# Patient Record
Sex: FEMALE | Race: ASIAN | NOT HISPANIC OR LATINO | Employment: PART TIME
[De-identification: names, ages, dates, MRNs, and addresses within clinical notes are randomized per-mention and may not be internally consistent; named-entity substitution may affect disease eponyms.]

---

## 2017-04-26 ENCOUNTER — RECORDS - HEALTHEAST (OUTPATIENT)
Dept: ADMINISTRATIVE | Facility: OTHER | Age: 47
End: 2017-04-26

## 2017-10-08 ENCOUNTER — RECORDS - HEALTHEAST (OUTPATIENT)
Dept: ADMINISTRATIVE | Facility: OTHER | Age: 47
End: 2017-10-08

## 2017-10-18 ENCOUNTER — COMMUNICATION - HEALTHEAST (OUTPATIENT)
Dept: FAMILY MEDICINE | Facility: CLINIC | Age: 47
End: 2017-10-18

## 2017-10-24 ENCOUNTER — OFFICE VISIT - HEALTHEAST (OUTPATIENT)
Dept: FAMILY MEDICINE | Facility: CLINIC | Age: 47
End: 2017-10-24

## 2017-10-24 DIAGNOSIS — Z71.84 TRAVEL ADVICE ENCOUNTER: ICD-10-CM

## 2017-10-24 ASSESSMENT — MIFFLIN-ST. JEOR: SCORE: 1374.42

## 2017-11-14 ENCOUNTER — OFFICE VISIT - HEALTHEAST (OUTPATIENT)
Dept: FAMILY MEDICINE | Facility: CLINIC | Age: 47
End: 2017-11-14

## 2017-11-14 DIAGNOSIS — R06.2 WHEEZING: ICD-10-CM

## 2017-11-14 DIAGNOSIS — J98.01 ACUTE BRONCHOSPASM: ICD-10-CM

## 2017-11-14 ASSESSMENT — MIFFLIN-ST. JEOR: SCORE: 1381.11

## 2018-02-20 ENCOUNTER — OFFICE VISIT - HEALTHEAST (OUTPATIENT)
Dept: FAMILY MEDICINE | Facility: CLINIC | Age: 48
End: 2018-02-20

## 2018-02-20 DIAGNOSIS — M79.671 FOOT PAIN, RIGHT: ICD-10-CM

## 2018-02-20 DIAGNOSIS — D50.8 IRON DEFICIENCY ANEMIA SECONDARY TO INADEQUATE DIETARY IRON INTAKE: ICD-10-CM

## 2018-02-20 DIAGNOSIS — Z00.00 ANNUAL PHYSICAL EXAM: ICD-10-CM

## 2018-02-20 DIAGNOSIS — Z12.31 VISIT FOR SCREENING MAMMOGRAM: ICD-10-CM

## 2018-02-20 DIAGNOSIS — Z23 NEED FOR VACCINATION: ICD-10-CM

## 2018-02-20 DIAGNOSIS — E55.9 VITAMIN D DEFICIENCY: ICD-10-CM

## 2018-02-20 LAB
ALBUMIN SERPL-MCNC: 3.5 G/DL (ref 3.5–5)
ALP SERPL-CCNC: 119 U/L (ref 45–120)
ALT SERPL W P-5'-P-CCNC: 18 U/L (ref 0–45)
ANION GAP SERPL CALCULATED.3IONS-SCNC: 10 MMOL/L (ref 5–18)
AST SERPL W P-5'-P-CCNC: 15 U/L (ref 0–40)
BILIRUB SERPL-MCNC: 0.3 MG/DL (ref 0–1)
BUN SERPL-MCNC: 21 MG/DL (ref 8–22)
CALCIUM SERPL-MCNC: 8.9 MG/DL (ref 8.5–10.5)
CHLORIDE BLD-SCNC: 105 MMOL/L (ref 98–107)
CHOLEST SERPL-MCNC: 164 MG/DL
CO2 SERPL-SCNC: 22 MMOL/L (ref 22–31)
CREAT SERPL-MCNC: 0.66 MG/DL (ref 0.6–1.1)
ERYTHROCYTE [DISTWIDTH] IN BLOOD BY AUTOMATED COUNT: 12.4 % (ref 11–14.5)
FASTING STATUS PATIENT QL REPORTED: NO
FERRITIN SERPL-MCNC: 3 NG/ML (ref 10–130)
GFR SERPL CREATININE-BSD FRML MDRD: >60 ML/MIN/1.73M2
GLUCOSE BLD-MCNC: 96 MG/DL (ref 70–125)
HBA1C MFR BLD: 5.5 % (ref 3.5–6)
HCT VFR BLD AUTO: 36.3 % (ref 35–47)
HDLC SERPL-MCNC: 47 MG/DL
HGB BLD-MCNC: 12.1 G/DL (ref 12–16)
IRON SERPL-MCNC: 20 UG/DL (ref 42–175)
LDLC SERPL CALC-MCNC: 87 MG/DL
MCH RBC QN AUTO: 28.1 PG (ref 27–34)
MCHC RBC AUTO-ENTMCNC: 33.4 G/DL (ref 32–36)
MCV RBC AUTO: 84 FL (ref 80–100)
PLATELET # BLD AUTO: 230 THOU/UL (ref 140–440)
PMV BLD AUTO: 8.1 FL (ref 7–10)
POTASSIUM BLD-SCNC: 3.6 MMOL/L (ref 3.5–5)
PROT SERPL-MCNC: 7.5 G/DL (ref 6–8)
RBC # BLD AUTO: 4.31 MILL/UL (ref 3.8–5.4)
SODIUM SERPL-SCNC: 137 MMOL/L (ref 136–145)
TRIGL SERPL-MCNC: 152 MG/DL
TSH SERPL DL<=0.005 MIU/L-ACNC: 1.27 UIU/ML (ref 0.3–5)
WBC: 7 THOU/UL (ref 4–11)

## 2018-02-20 ASSESSMENT — MIFFLIN-ST. JEOR: SCORE: 1382.92

## 2018-02-21 LAB — 25(OH)D3 SERPL-MCNC: 29.1 NG/ML (ref 30–80)

## 2018-02-28 ENCOUNTER — COMMUNICATION - HEALTHEAST (OUTPATIENT)
Dept: FAMILY MEDICINE | Facility: CLINIC | Age: 48
End: 2018-02-28

## 2018-03-01 ENCOUNTER — COMMUNICATION - HEALTHEAST (OUTPATIENT)
Dept: FAMILY MEDICINE | Facility: CLINIC | Age: 48
End: 2018-03-01

## 2018-03-02 ENCOUNTER — HOSPITAL ENCOUNTER (OUTPATIENT)
Dept: MAMMOGRAPHY | Facility: CLINIC | Age: 48
Discharge: HOME | End: 2018-03-02
Attending: NURSE PRACTITIONER

## 2018-03-02 DIAGNOSIS — Z12.31 VISIT FOR SCREENING MAMMOGRAM: ICD-10-CM

## 2018-10-18 ENCOUNTER — RECORDS - HEALTHEAST (OUTPATIENT)
Dept: GENERAL RADIOLOGY | Facility: CLINIC | Age: 48
End: 2018-10-18

## 2018-10-18 ENCOUNTER — OFFICE VISIT - HEALTHEAST (OUTPATIENT)
Dept: FAMILY MEDICINE | Facility: CLINIC | Age: 48
End: 2018-10-18

## 2018-10-18 DIAGNOSIS — S99.921A FOOT INJURY, RIGHT, INITIAL ENCOUNTER: ICD-10-CM

## 2018-10-18 DIAGNOSIS — S99.921A UNSPECIFIED INJURY OF RIGHT FOOT, INITIAL ENCOUNTER: ICD-10-CM

## 2018-10-18 ASSESSMENT — MIFFLIN-ST. JEOR: SCORE: 1357.41

## 2018-10-22 ENCOUNTER — OFFICE VISIT - HEALTHEAST (OUTPATIENT)
Dept: FAMILY MEDICINE | Facility: CLINIC | Age: 48
End: 2018-10-22

## 2018-10-22 ENCOUNTER — RECORDS - HEALTHEAST (OUTPATIENT)
Dept: GENERAL RADIOLOGY | Facility: CLINIC | Age: 48
End: 2018-10-22

## 2018-10-22 DIAGNOSIS — M54.2 NECK PAIN: ICD-10-CM

## 2018-10-22 DIAGNOSIS — M54.2 CERVICALGIA: ICD-10-CM

## 2018-10-22 DIAGNOSIS — R10.2 PELVIC PAIN IN FEMALE: ICD-10-CM

## 2018-10-22 DIAGNOSIS — Z12.4 CERVICAL CANCER SCREENING: ICD-10-CM

## 2018-10-22 DIAGNOSIS — R05.9 COUGH: ICD-10-CM

## 2018-10-23 ENCOUNTER — HOSPITAL ENCOUNTER (OUTPATIENT)
Dept: ULTRASOUND IMAGING | Facility: CLINIC | Age: 48
Discharge: HOME | End: 2018-10-23
Attending: FAMILY MEDICINE

## 2018-10-23 DIAGNOSIS — R10.2 PELVIC PAIN IN FEMALE: ICD-10-CM

## 2018-10-23 LAB
HPV SOURCE: NORMAL
HUMAN PAPILLOMA VIRUS 16 DNA: NEGATIVE
HUMAN PAPILLOMA VIRUS 18 DNA: NEGATIVE
HUMAN PAPILLOMA VIRUS FINAL DIAGNOSIS: NORMAL
HUMAN PAPILLOMA VIRUS OTHER HR: NEGATIVE
SPECIMEN DESCRIPTION: NORMAL

## 2018-10-26 LAB
BKR LAB AP ABNORMAL BLEEDING: NO
BKR LAB AP BIRTH CONTROL/HORMONES: NORMAL
BKR LAB AP CERVICAL APPEARANCE: NORMAL
BKR LAB AP GYN ADEQUACY: NORMAL
BKR LAB AP GYN INTERPRETATION: NORMAL
BKR LAB AP HPV REFLEX: NORMAL
BKR LAB AP LMP: NORMAL
BKR LAB AP PATIENT STATUS: NORMAL
BKR LAB AP PREVIOUS ABNORMAL: NORMAL
BKR LAB AP PREVIOUS NORMAL: 2014
HIGH RISK?: NO
PATH REPORT.COMMENTS IMP SPEC: NORMAL
RESULT FLAG (HE HISTORICAL CONVERSION): NORMAL

## 2018-10-29 ENCOUNTER — COMMUNICATION - HEALTHEAST (OUTPATIENT)
Dept: FAMILY MEDICINE | Facility: CLINIC | Age: 48
End: 2018-10-29

## 2018-11-01 ENCOUNTER — COMMUNICATION - HEALTHEAST (OUTPATIENT)
Dept: ADMINISTRATIVE | Facility: CLINIC | Age: 48
End: 2018-11-01

## 2018-11-20 ENCOUNTER — OFFICE VISIT - HEALTHEAST (OUTPATIENT)
Dept: PHYSICAL THERAPY | Facility: REHABILITATION | Age: 48
End: 2018-11-20

## 2018-11-20 DIAGNOSIS — R29.3 POOR POSTURE: ICD-10-CM

## 2018-11-20 DIAGNOSIS — M54.2 NECK AND SHOULDER PAIN: ICD-10-CM

## 2018-11-20 DIAGNOSIS — M25.519 NECK AND SHOULDER PAIN: ICD-10-CM

## 2018-11-20 DIAGNOSIS — M62.81 GENERALIZED MUSCLE WEAKNESS: ICD-10-CM

## 2018-11-26 ENCOUNTER — OFFICE VISIT - HEALTHEAST (OUTPATIENT)
Dept: PHYSICAL THERAPY | Facility: REHABILITATION | Age: 48
End: 2018-11-26

## 2018-11-26 DIAGNOSIS — M62.81 GENERALIZED MUSCLE WEAKNESS: ICD-10-CM

## 2018-11-26 DIAGNOSIS — M54.2 NECK AND SHOULDER PAIN: ICD-10-CM

## 2018-11-26 DIAGNOSIS — M25.519 NECK AND SHOULDER PAIN: ICD-10-CM

## 2018-11-26 DIAGNOSIS — R29.3 POOR POSTURE: ICD-10-CM

## 2018-12-03 ENCOUNTER — OFFICE VISIT - HEALTHEAST (OUTPATIENT)
Dept: PHYSICAL THERAPY | Facility: REHABILITATION | Age: 48
End: 2018-12-03

## 2018-12-03 DIAGNOSIS — M54.2 NECK AND SHOULDER PAIN: ICD-10-CM

## 2018-12-03 DIAGNOSIS — M62.81 GENERALIZED MUSCLE WEAKNESS: ICD-10-CM

## 2018-12-03 DIAGNOSIS — M25.519 NECK AND SHOULDER PAIN: ICD-10-CM

## 2018-12-03 DIAGNOSIS — R29.3 POOR POSTURE: ICD-10-CM

## 2018-12-05 ENCOUNTER — OFFICE VISIT - HEALTHEAST (OUTPATIENT)
Dept: PHYSICAL THERAPY | Facility: REHABILITATION | Age: 48
End: 2018-12-05

## 2018-12-05 DIAGNOSIS — M54.2 NECK AND SHOULDER PAIN: ICD-10-CM

## 2018-12-05 DIAGNOSIS — M25.519 NECK AND SHOULDER PAIN: ICD-10-CM

## 2018-12-05 DIAGNOSIS — R29.3 POOR POSTURE: ICD-10-CM

## 2018-12-05 DIAGNOSIS — M62.81 GENERALIZED MUSCLE WEAKNESS: ICD-10-CM

## 2018-12-10 ENCOUNTER — OFFICE VISIT - HEALTHEAST (OUTPATIENT)
Dept: PHYSICAL THERAPY | Facility: REHABILITATION | Age: 48
End: 2018-12-10

## 2018-12-10 DIAGNOSIS — M25.519 NECK AND SHOULDER PAIN: ICD-10-CM

## 2018-12-10 DIAGNOSIS — R29.3 POOR POSTURE: ICD-10-CM

## 2018-12-10 DIAGNOSIS — M54.2 NECK AND SHOULDER PAIN: ICD-10-CM

## 2018-12-10 DIAGNOSIS — M62.81 GENERALIZED MUSCLE WEAKNESS: ICD-10-CM

## 2018-12-11 ENCOUNTER — OFFICE VISIT - HEALTHEAST (OUTPATIENT)
Dept: FAMILY MEDICINE | Facility: CLINIC | Age: 48
End: 2018-12-11

## 2018-12-11 DIAGNOSIS — M54.2 NECK PAIN: ICD-10-CM

## 2018-12-11 DIAGNOSIS — R51.9 NONINTRACTABLE EPISODIC HEADACHE, UNSPECIFIED HEADACHE TYPE: ICD-10-CM

## 2018-12-12 ENCOUNTER — OFFICE VISIT - HEALTHEAST (OUTPATIENT)
Dept: PHYSICAL THERAPY | Facility: REHABILITATION | Age: 48
End: 2018-12-12

## 2018-12-12 DIAGNOSIS — M54.2 NECK AND SHOULDER PAIN: ICD-10-CM

## 2018-12-12 DIAGNOSIS — R29.3 POOR POSTURE: ICD-10-CM

## 2018-12-12 DIAGNOSIS — M62.81 GENERALIZED MUSCLE WEAKNESS: ICD-10-CM

## 2018-12-12 DIAGNOSIS — M25.519 NECK AND SHOULDER PAIN: ICD-10-CM

## 2018-12-19 ENCOUNTER — OFFICE VISIT - HEALTHEAST (OUTPATIENT)
Dept: PHYSICAL THERAPY | Facility: REHABILITATION | Age: 48
End: 2018-12-19

## 2018-12-19 DIAGNOSIS — M25.519 NECK AND SHOULDER PAIN: ICD-10-CM

## 2018-12-19 DIAGNOSIS — M62.81 GENERALIZED MUSCLE WEAKNESS: ICD-10-CM

## 2018-12-19 DIAGNOSIS — R29.3 POOR POSTURE: ICD-10-CM

## 2018-12-19 DIAGNOSIS — M54.2 NECK AND SHOULDER PAIN: ICD-10-CM

## 2019-02-11 ENCOUNTER — OFFICE VISIT - HEALTHEAST (OUTPATIENT)
Dept: FAMILY MEDICINE | Facility: CLINIC | Age: 49
End: 2019-02-11

## 2019-02-11 ENCOUNTER — RECORDS - HEALTHEAST (OUTPATIENT)
Dept: GENERAL RADIOLOGY | Facility: CLINIC | Age: 49
End: 2019-02-11

## 2019-02-11 DIAGNOSIS — R51.9 NONINTRACTABLE HEADACHE, UNSPECIFIED CHRONICITY PATTERN, UNSPECIFIED HEADACHE TYPE: ICD-10-CM

## 2019-02-11 DIAGNOSIS — M79.674 PAIN IN RIGHT TOE(S): ICD-10-CM

## 2019-02-11 DIAGNOSIS — M54.2 NECK PAIN: ICD-10-CM

## 2019-02-11 DIAGNOSIS — M79.674 PAIN OF TOE OF RIGHT FOOT: ICD-10-CM

## 2019-02-11 DIAGNOSIS — J30.9 ALLERGIC RHINITIS: ICD-10-CM

## 2019-02-26 ENCOUNTER — RECORDS - HEALTHEAST (OUTPATIENT)
Dept: ADMINISTRATIVE | Facility: OTHER | Age: 49
End: 2019-02-26

## 2019-04-03 ENCOUNTER — OFFICE VISIT - HEALTHEAST (OUTPATIENT)
Dept: FAMILY MEDICINE | Facility: CLINIC | Age: 49
End: 2019-04-03

## 2019-04-03 DIAGNOSIS — Z13.220 LIPID SCREENING: ICD-10-CM

## 2019-04-03 DIAGNOSIS — D50.8 IRON DEFICIENCY ANEMIA SECONDARY TO INADEQUATE DIETARY IRON INTAKE: ICD-10-CM

## 2019-04-03 DIAGNOSIS — E04.9 THYROID ENLARGED: ICD-10-CM

## 2019-04-03 DIAGNOSIS — Z12.31 VISIT FOR SCREENING MAMMOGRAM: ICD-10-CM

## 2019-04-03 DIAGNOSIS — Z00.00 ROUTINE GENERAL MEDICAL EXAMINATION AT A HEALTH CARE FACILITY: ICD-10-CM

## 2019-04-03 DIAGNOSIS — R53.83 OTHER FATIGUE: ICD-10-CM

## 2019-04-03 DIAGNOSIS — Z13.1 SCREENING FOR DIABETES MELLITUS: ICD-10-CM

## 2019-04-03 DIAGNOSIS — J30.9 ALLERGIC RHINITIS, UNSPECIFIED SEASONALITY, UNSPECIFIED TRIGGER: ICD-10-CM

## 2019-04-03 DIAGNOSIS — E55.9 VITAMIN D DEFICIENCY: ICD-10-CM

## 2019-04-03 DIAGNOSIS — Z83.49 FAMILY HISTORY OF B12 DEFICIENCY: ICD-10-CM

## 2019-04-03 ASSESSMENT — MIFFLIN-ST. JEOR: SCORE: 1380.65

## 2019-04-08 ENCOUNTER — HOSPITAL ENCOUNTER (OUTPATIENT)
Dept: ULTRASOUND IMAGING | Facility: CLINIC | Age: 49
Discharge: HOME | End: 2019-04-08
Attending: FAMILY MEDICINE

## 2019-04-08 DIAGNOSIS — E04.9 THYROID ENLARGED: ICD-10-CM

## 2019-04-09 ENCOUNTER — AMBULATORY - HEALTHEAST (OUTPATIENT)
Dept: LAB | Facility: CLINIC | Age: 49
End: 2019-04-09

## 2019-04-09 DIAGNOSIS — Z13.220 LIPID SCREENING: ICD-10-CM

## 2019-04-09 DIAGNOSIS — Z83.49 FAMILY HISTORY OF B12 DEFICIENCY: ICD-10-CM

## 2019-04-09 DIAGNOSIS — E55.9 VITAMIN D DEFICIENCY: ICD-10-CM

## 2019-04-09 DIAGNOSIS — Z13.1 SCREENING FOR DIABETES MELLITUS: ICD-10-CM

## 2019-04-09 DIAGNOSIS — E04.9 THYROID ENLARGED: ICD-10-CM

## 2019-04-09 DIAGNOSIS — R53.83 OTHER FATIGUE: ICD-10-CM

## 2019-04-09 DIAGNOSIS — D50.8 IRON DEFICIENCY ANEMIA SECONDARY TO INADEQUATE DIETARY IRON INTAKE: ICD-10-CM

## 2019-04-09 LAB
ALBUMIN SERPL-MCNC: 3.7 G/DL (ref 3.5–5)
ALP SERPL-CCNC: 106 U/L (ref 45–120)
ALT SERPL W P-5'-P-CCNC: 17 U/L (ref 0–45)
ANION GAP SERPL CALCULATED.3IONS-SCNC: 10 MMOL/L (ref 5–18)
AST SERPL W P-5'-P-CCNC: 16 U/L (ref 0–40)
BILIRUB SERPL-MCNC: 0.3 MG/DL (ref 0–1)
BUN SERPL-MCNC: 16 MG/DL (ref 8–22)
CALCIUM SERPL-MCNC: 9.1 MG/DL (ref 8.5–10.5)
CHLORIDE BLD-SCNC: 104 MMOL/L (ref 98–107)
CHOLEST SERPL-MCNC: 159 MG/DL
CO2 SERPL-SCNC: 26 MMOL/L (ref 22–31)
CREAT SERPL-MCNC: 0.76 MG/DL (ref 0.6–1.1)
ERYTHROCYTE [DISTWIDTH] IN BLOOD BY AUTOMATED COUNT: 11.3 % (ref 11–14.5)
FASTING STATUS PATIENT QL REPORTED: YES
FERRITIN SERPL-MCNC: 8 NG/ML (ref 10–130)
GFR SERPL CREATININE-BSD FRML MDRD: >60 ML/MIN/1.73M2
GLUCOSE BLD-MCNC: 102 MG/DL (ref 70–125)
HBA1C MFR BLD: 5.5 % (ref 3.5–6)
HCT VFR BLD AUTO: 39.5 % (ref 35–47)
HDLC SERPL-MCNC: 52 MG/DL
HGB BLD-MCNC: 13.1 G/DL (ref 12–16)
IRON SATN MFR SERPL: 15 % (ref 20–50)
IRON SERPL-MCNC: 51 UG/DL (ref 42–175)
LDLC SERPL CALC-MCNC: 90 MG/DL
MCH RBC QN AUTO: 31 PG (ref 27–34)
MCHC RBC AUTO-ENTMCNC: 33.3 G/DL (ref 32–36)
MCV RBC AUTO: 93 FL (ref 80–100)
PLATELET # BLD AUTO: 227 THOU/UL (ref 140–440)
PMV BLD AUTO: 8.7 FL (ref 7–10)
POTASSIUM BLD-SCNC: 4 MMOL/L (ref 3.5–5)
PROT SERPL-MCNC: 7.2 G/DL (ref 6–8)
RBC # BLD AUTO: 4.23 MILL/UL (ref 3.8–5.4)
SODIUM SERPL-SCNC: 140 MMOL/L (ref 136–145)
T4 FREE SERPL-MCNC: 1 NG/DL (ref 0.7–1.8)
TIBC SERPL-MCNC: 350 UG/DL (ref 313–563)
TRANSFERRIN SERPL-MCNC: 280 MG/DL (ref 212–360)
TRIGL SERPL-MCNC: 85 MG/DL
TSH SERPL DL<=0.005 MIU/L-ACNC: 3 UIU/ML (ref 0.3–5)
VIT B12 SERPL-MCNC: 149 PG/ML (ref 213–816)
WBC: 4.2 THOU/UL (ref 4–11)

## 2019-04-10 LAB — 25(OH)D3 SERPL-MCNC: 36.5 NG/ML (ref 30–80)

## 2019-04-11 ENCOUNTER — COMMUNICATION - HEALTHEAST (OUTPATIENT)
Dept: FAMILY MEDICINE | Facility: CLINIC | Age: 49
End: 2019-04-11

## 2019-07-10 ENCOUNTER — HOSPITAL ENCOUNTER (OUTPATIENT)
Dept: MAMMOGRAPHY | Facility: CLINIC | Age: 49
Discharge: HOME | End: 2019-07-10
Attending: FAMILY MEDICINE

## 2019-07-10 DIAGNOSIS — Z12.31 VISIT FOR SCREENING MAMMOGRAM: ICD-10-CM

## 2020-01-29 ENCOUNTER — OFFICE VISIT - HEALTHEAST (OUTPATIENT)
Dept: FAMILY MEDICINE | Facility: CLINIC | Age: 50
End: 2020-01-29

## 2020-01-29 DIAGNOSIS — D50.9 IRON DEFICIENCY ANEMIA, UNSPECIFIED IRON DEFICIENCY ANEMIA TYPE: ICD-10-CM

## 2020-01-29 DIAGNOSIS — L29.9 ITCHY SKIN: ICD-10-CM

## 2020-01-29 DIAGNOSIS — T78.40XS ALLERGIC STATE, SEQUELA: ICD-10-CM

## 2020-01-29 LAB
ALBUMIN SERPL-MCNC: 3.9 G/DL (ref 3.5–5)
ALP SERPL-CCNC: 119 U/L (ref 45–120)
ALT SERPL W P-5'-P-CCNC: 18 U/L (ref 0–45)
ANION GAP SERPL CALCULATED.3IONS-SCNC: 9 MMOL/L (ref 5–18)
AST SERPL W P-5'-P-CCNC: 15 U/L (ref 0–40)
BILIRUB SERPL-MCNC: 0.3 MG/DL (ref 0–1)
BUN SERPL-MCNC: 16 MG/DL (ref 8–22)
CALCIUM SERPL-MCNC: 9.1 MG/DL (ref 8.5–10.5)
CHLORIDE BLD-SCNC: 103 MMOL/L (ref 98–107)
CLUE CELLS: NORMAL
CO2 SERPL-SCNC: 26 MMOL/L (ref 22–31)
CREAT SERPL-MCNC: 0.72 MG/DL (ref 0.6–1.1)
ERYTHROCYTE [DISTWIDTH] IN BLOOD BY AUTOMATED COUNT: 12.8 % (ref 11–14.5)
FERRITIN SERPL-MCNC: <2 NG/ML (ref 10–130)
GFR SERPL CREATININE-BSD FRML MDRD: >60 ML/MIN/1.73M2
GLUCOSE BLD-MCNC: 96 MG/DL (ref 70–125)
HCT VFR BLD AUTO: 31.3 % (ref 35–47)
HGB BLD-MCNC: 10 G/DL (ref 12–16)
MCH RBC QN AUTO: 23.5 PG (ref 27–34)
MCHC RBC AUTO-ENTMCNC: 31.9 G/DL (ref 32–36)
MCV RBC AUTO: 74 FL (ref 80–100)
PLATELET # BLD AUTO: 277 THOU/UL (ref 140–440)
PMV BLD AUTO: 7.8 FL (ref 7–10)
POTASSIUM BLD-SCNC: 3.8 MMOL/L (ref 3.5–5)
PROT SERPL-MCNC: 7.6 G/DL (ref 6–8)
RBC # BLD AUTO: 4.25 MILL/UL (ref 3.8–5.4)
SODIUM SERPL-SCNC: 138 MMOL/L (ref 136–145)
TRICHOMONAS, WET PREP: NORMAL
WBC: 6.9 THOU/UL (ref 4–11)
YEAST, WET PREP: NORMAL

## 2020-01-30 ENCOUNTER — COMMUNICATION - HEALTHEAST (OUTPATIENT)
Dept: FAMILY MEDICINE | Facility: CLINIC | Age: 50
End: 2020-01-30

## 2020-04-20 ENCOUNTER — OFFICE VISIT - HEALTHEAST (OUTPATIENT)
Dept: FAMILY MEDICINE | Facility: CLINIC | Age: 50
End: 2020-04-20

## 2020-04-20 DIAGNOSIS — E55.9 VITAMIN D DEFICIENCY: ICD-10-CM

## 2020-04-20 DIAGNOSIS — E53.8 VITAMIN B12 DEFICIENCY (NON ANEMIC): ICD-10-CM

## 2020-04-20 DIAGNOSIS — D50.8 IRON DEFICIENCY ANEMIA SECONDARY TO INADEQUATE DIETARY IRON INTAKE: ICD-10-CM

## 2020-04-20 DIAGNOSIS — L29.9 ITCHY SKIN: ICD-10-CM

## 2020-04-21 ENCOUNTER — AMBULATORY - HEALTHEAST (OUTPATIENT)
Dept: LAB | Facility: CLINIC | Age: 50
End: 2020-04-21

## 2020-04-21 DIAGNOSIS — L29.9 ITCHY SKIN: ICD-10-CM

## 2020-04-21 DIAGNOSIS — E55.9 VITAMIN D DEFICIENCY: ICD-10-CM

## 2020-04-21 DIAGNOSIS — E53.8 VITAMIN B12 DEFICIENCY (NON ANEMIC): ICD-10-CM

## 2020-04-21 DIAGNOSIS — D50.8 IRON DEFICIENCY ANEMIA SECONDARY TO INADEQUATE DIETARY IRON INTAKE: ICD-10-CM

## 2020-04-21 LAB
BASOPHILS # BLD AUTO: 0 THOU/UL (ref 0–0.2)
BASOPHILS NFR BLD AUTO: 0 % (ref 0–2)
EOSINOPHIL # BLD AUTO: 0.7 THOU/UL (ref 0–0.4)
EOSINOPHIL NFR BLD AUTO: 12 % (ref 0–6)
ERYTHROCYTE [DISTWIDTH] IN BLOOD BY AUTOMATED COUNT: 17.3 % (ref 11–14.5)
ERYTHROCYTE [SEDIMENTATION RATE] IN BLOOD BY WESTERGREN METHOD: 8 MM/HR (ref 0–20)
FERRITIN SERPL-MCNC: 11 NG/ML (ref 10–130)
HCT VFR BLD AUTO: 43.7 % (ref 35–47)
HGB BLD-MCNC: 14.1 G/DL (ref 12–16)
IRON SATN MFR SERPL: 38 % (ref 20–50)
IRON SERPL-MCNC: 125 UG/DL (ref 42–175)
LYMPHOCYTES # BLD AUTO: 1.4 THOU/UL (ref 0.8–4.4)
LYMPHOCYTES NFR BLD AUTO: 22 % (ref 20–40)
MCH RBC QN AUTO: 27.8 PG (ref 27–34)
MCHC RBC AUTO-ENTMCNC: 32.3 G/DL (ref 32–36)
MCV RBC AUTO: 86 FL (ref 80–100)
MONOCYTES # BLD AUTO: 0.4 THOU/UL (ref 0–0.9)
MONOCYTES NFR BLD AUTO: 6 % (ref 2–10)
NEUTROPHILS # BLD AUTO: 3.8 THOU/UL (ref 2–7.7)
NEUTROPHILS NFR BLD AUTO: 60 % (ref 50–70)
PLATELET # BLD AUTO: 239 THOU/UL (ref 140–440)
PMV BLD AUTO: 11.2 FL (ref 8.5–12.5)
RBC # BLD AUTO: 5.08 MILL/UL (ref 3.8–5.4)
TIBC SERPL-MCNC: 329 UG/DL (ref 313–563)
TRANSFERRIN SERPL-MCNC: 263 MG/DL (ref 212–360)
VIT B12 SERPL-MCNC: >2000 PG/ML (ref 213–816)
WBC: 6.3 THOU/UL (ref 4–11)

## 2020-04-23 LAB
25(OH)D3 SERPL-MCNC: 38 NG/ML (ref 30–80)
25(OH)D3 SERPL-MCNC: 38 NG/ML (ref 30–80)

## 2020-04-24 LAB
GLIADIN IGA SER-ACNC: 1.8 U/ML
GLIADIN IGG SER-ACNC: 0.5 U/ML
IGA SERPL-MCNC: 185 MG/DL (ref 65–400)
TTG IGA SER-ACNC: 0.4 U/ML
TTG IGG SER-ACNC: <0.6 U/ML

## 2020-04-29 ENCOUNTER — COMMUNICATION - HEALTHEAST (OUTPATIENT)
Dept: FAMILY MEDICINE | Facility: CLINIC | Age: 50
End: 2020-04-29

## 2020-05-06 ENCOUNTER — RECORDS - HEALTHEAST (OUTPATIENT)
Dept: ADMINISTRATIVE | Facility: OTHER | Age: 50
End: 2020-05-06

## 2020-06-12 ENCOUNTER — RECORDS - HEALTHEAST (OUTPATIENT)
Dept: ADMINISTRATIVE | Facility: OTHER | Age: 50
End: 2020-06-12

## 2020-07-02 ENCOUNTER — OFFICE VISIT - HEALTHEAST (OUTPATIENT)
Dept: FAMILY MEDICINE | Facility: CLINIC | Age: 50
End: 2020-07-02

## 2020-07-02 DIAGNOSIS — R35.0 FREQUENT URINATION: ICD-10-CM

## 2020-07-02 DIAGNOSIS — N30.01 ACUTE CYSTITIS WITH HEMATURIA: ICD-10-CM

## 2020-07-02 LAB
ALBUMIN UR-MCNC: ABNORMAL MG/DL
APPEARANCE UR: CLEAR
BILIRUB UR QL STRIP: NEGATIVE
COLOR UR AUTO: YELLOW
GLUCOSE UR STRIP-MCNC: NEGATIVE MG/DL
HGB UR QL STRIP: ABNORMAL
KETONES UR STRIP-MCNC: NEGATIVE MG/DL
LEUKOCYTE ESTERASE UR QL STRIP: ABNORMAL
NITRATE UR QL: NEGATIVE
PH UR STRIP: 6.5 [PH] (ref 5–8)
SP GR UR STRIP: 1.01 (ref 1–1.03)
UROBILINOGEN UR STRIP-ACNC: ABNORMAL

## 2020-07-02 ASSESSMENT — MIFFLIN-ST. JEOR: SCORE: 1372.15

## 2020-07-04 LAB — BACTERIA SPEC CULT: ABNORMAL

## 2020-08-11 ENCOUNTER — HOSPITAL ENCOUNTER (OUTPATIENT)
Dept: MAMMOGRAPHY | Facility: CLINIC | Age: 50
Discharge: HOME | End: 2020-08-11
Attending: FAMILY MEDICINE

## 2020-08-11 DIAGNOSIS — Z12.31 VISIT FOR SCREENING MAMMOGRAM: ICD-10-CM

## 2020-08-20 ENCOUNTER — HOSPITAL ENCOUNTER (OUTPATIENT)
Dept: MAMMOGRAPHY | Facility: CLINIC | Age: 50
Discharge: HOME | End: 2020-08-20
Attending: FAMILY MEDICINE

## 2020-08-20 DIAGNOSIS — N64.89 BREAST ASYMMETRY: ICD-10-CM

## 2020-08-26 ENCOUNTER — OFFICE VISIT - HEALTHEAST (OUTPATIENT)
Dept: FAMILY MEDICINE | Facility: CLINIC | Age: 50
End: 2020-08-26

## 2020-08-26 DIAGNOSIS — Z13.1 SCREENING FOR DIABETES MELLITUS: ICD-10-CM

## 2020-08-26 DIAGNOSIS — E53.8 VITAMIN B12 DEFICIENCY (NON ANEMIC): ICD-10-CM

## 2020-08-26 DIAGNOSIS — Z00.00 ROUTINE GENERAL MEDICAL EXAMINATION AT A HEALTH CARE FACILITY: ICD-10-CM

## 2020-08-26 DIAGNOSIS — L29.9 ITCHY SKIN: ICD-10-CM

## 2020-08-26 DIAGNOSIS — D50.8 IRON DEFICIENCY ANEMIA SECONDARY TO INADEQUATE DIETARY IRON INTAKE: ICD-10-CM

## 2020-08-26 DIAGNOSIS — Z13.220 LIPID SCREENING: ICD-10-CM

## 2020-08-26 DIAGNOSIS — E55.9 VITAMIN D DEFICIENCY: ICD-10-CM

## 2020-08-26 ASSESSMENT — MIFFLIN-ST. JEOR: SCORE: 1367.5

## 2020-09-14 ENCOUNTER — RECORDS - HEALTHEAST (OUTPATIENT)
Dept: ADMINISTRATIVE | Facility: OTHER | Age: 50
End: 2020-09-14

## 2020-10-19 ENCOUNTER — RECORDS - HEALTHEAST (OUTPATIENT)
Dept: ADMINISTRATIVE | Facility: OTHER | Age: 50
End: 2020-10-19

## 2020-10-22 ENCOUNTER — RECORDS - HEALTHEAST (OUTPATIENT)
Dept: ADMINISTRATIVE | Facility: OTHER | Age: 50
End: 2020-10-22

## 2020-11-09 ENCOUNTER — RECORDS - HEALTHEAST (OUTPATIENT)
Dept: GENERAL RADIOLOGY | Facility: CLINIC | Age: 50
End: 2020-11-09

## 2020-11-09 ENCOUNTER — OFFICE VISIT - HEALTHEAST (OUTPATIENT)
Dept: INTERNAL MEDICINE | Facility: CLINIC | Age: 50
End: 2020-11-09

## 2020-11-09 DIAGNOSIS — M79.671 RIGHT FOOT PAIN: ICD-10-CM

## 2020-11-09 DIAGNOSIS — Z13.1 SCREENING FOR DIABETES MELLITUS: ICD-10-CM

## 2020-11-09 DIAGNOSIS — D50.8 IRON DEFICIENCY ANEMIA SECONDARY TO INADEQUATE DIETARY IRON INTAKE: ICD-10-CM

## 2020-11-09 DIAGNOSIS — M79.671 PAIN IN RIGHT FOOT: ICD-10-CM

## 2020-11-09 DIAGNOSIS — R30.0 DYSURIA: ICD-10-CM

## 2020-11-09 DIAGNOSIS — M79.671 PAIN OF RIGHT HEEL: ICD-10-CM

## 2020-11-09 DIAGNOSIS — R35.0 URINARY FREQUENCY: ICD-10-CM

## 2020-11-09 DIAGNOSIS — E53.8 VITAMIN B12 DEFICIENCY (NON ANEMIC): ICD-10-CM

## 2020-11-09 DIAGNOSIS — E55.9 VITAMIN D DEFICIENCY: ICD-10-CM

## 2020-11-09 DIAGNOSIS — Z13.220 LIPID SCREENING: ICD-10-CM

## 2020-11-09 LAB
ALBUMIN SERPL-MCNC: 4 G/DL (ref 3.5–5)
ALBUMIN UR-MCNC: NEGATIVE MG/DL
ALP SERPL-CCNC: 119 U/L (ref 45–120)
ALT SERPL W P-5'-P-CCNC: 17 U/L (ref 0–45)
ANION GAP SERPL CALCULATED.3IONS-SCNC: 11 MMOL/L (ref 5–18)
APPEARANCE UR: CLEAR
AST SERPL W P-5'-P-CCNC: 14 U/L (ref 0–40)
BACTERIA #/AREA URNS HPF: ABNORMAL HPF
BASOPHILS # BLD AUTO: 0 THOU/UL (ref 0–0.2)
BASOPHILS NFR BLD AUTO: 0 % (ref 0–2)
BILIRUB SERPL-MCNC: 0.2 MG/DL (ref 0–1)
BILIRUB UR QL STRIP: NEGATIVE
BUN SERPL-MCNC: 19 MG/DL (ref 8–22)
CALCIUM SERPL-MCNC: 9.3 MG/DL (ref 8.5–10.5)
CHLORIDE BLD-SCNC: 104 MMOL/L (ref 98–107)
CHOLEST SERPL-MCNC: 162 MG/DL
CO2 SERPL-SCNC: 24 MMOL/L (ref 22–31)
COLOR UR AUTO: YELLOW
CREAT SERPL-MCNC: 0.7 MG/DL (ref 0.6–1.1)
EOSINOPHIL # BLD AUTO: 0.6 THOU/UL (ref 0–0.4)
EOSINOPHIL NFR BLD AUTO: 9 % (ref 0–6)
ERYTHROCYTE [DISTWIDTH] IN BLOOD BY AUTOMATED COUNT: 11.6 % (ref 11–14.5)
FASTING STATUS PATIENT QL REPORTED: YES
GFR SERPL CREATININE-BSD FRML MDRD: >60 ML/MIN/1.73M2
GLUCOSE BLD-MCNC: 96 MG/DL (ref 70–125)
GLUCOSE UR STRIP-MCNC: NEGATIVE MG/DL
HCT VFR BLD AUTO: 34.1 % (ref 35–47)
HDLC SERPL-MCNC: 53 MG/DL
HGB BLD-MCNC: 11 G/DL (ref 12–16)
HGB UR QL STRIP: ABNORMAL
IRON SATN MFR SERPL: 3 % (ref 20–50)
IRON SERPL-MCNC: 13 UG/DL (ref 42–175)
KETONES UR STRIP-MCNC: NEGATIVE MG/DL
LDLC SERPL CALC-MCNC: 83 MG/DL
LEUKOCYTE ESTERASE UR QL STRIP: ABNORMAL
LYMPHOCYTES # BLD AUTO: 1.6 THOU/UL (ref 0.8–4.4)
LYMPHOCYTES NFR BLD AUTO: 25 % (ref 20–40)
MCH RBC QN AUTO: 25.8 PG (ref 27–34)
MCHC RBC AUTO-ENTMCNC: 32.2 G/DL (ref 32–36)
MCV RBC AUTO: 80 FL (ref 80–100)
MONOCYTES # BLD AUTO: 0.2 THOU/UL (ref 0–0.9)
MONOCYTES NFR BLD AUTO: 3 % (ref 2–10)
NEUTROPHILS # BLD AUTO: 4.2 THOU/UL (ref 2–7.7)
NEUTROPHILS NFR BLD AUTO: 63 % (ref 50–70)
NITRATE UR QL: NEGATIVE
PH UR STRIP: 7 [PH] (ref 5–8)
PLATELET # BLD AUTO: 273 THOU/UL (ref 140–440)
PMV BLD AUTO: 8.3 FL (ref 7–10)
POTASSIUM BLD-SCNC: 4.2 MMOL/L (ref 3.5–5)
PROT SERPL-MCNC: 7.7 G/DL (ref 6–8)
RBC # BLD AUTO: 4.24 MILL/UL (ref 3.8–5.4)
RBC #/AREA URNS AUTO: ABNORMAL HPF
SODIUM SERPL-SCNC: 139 MMOL/L (ref 136–145)
SP GR UR STRIP: 1.01 (ref 1–1.03)
SQUAMOUS #/AREA URNS AUTO: ABNORMAL LPF
TIBC SERPL-MCNC: 432 UG/DL (ref 313–563)
TRANSFERRIN SERPL-MCNC: 346 MG/DL (ref 212–360)
TRIGL SERPL-MCNC: 129 MG/DL
UROBILINOGEN UR STRIP-ACNC: ABNORMAL
VIT B12 SERPL-MCNC: 507 PG/ML (ref 213–816)
WBC #/AREA URNS AUTO: ABNORMAL HPF
WBC: 6.7 THOU/UL (ref 4–11)

## 2020-11-10 LAB
25(OH)D3 SERPL-MCNC: 29.8 NG/ML (ref 30–80)
25(OH)D3 SERPL-MCNC: 29.8 NG/ML (ref 30–80)
BACTERIA SPEC CULT: NO GROWTH

## 2020-12-16 ENCOUNTER — RECORDS - HEALTHEAST (OUTPATIENT)
Dept: ADMINISTRATIVE | Facility: OTHER | Age: 50
End: 2020-12-16

## 2021-02-08 ENCOUNTER — RECORDS - HEALTHEAST (OUTPATIENT)
Dept: GENERAL RADIOLOGY | Facility: CLINIC | Age: 51
End: 2021-02-08

## 2021-02-08 ENCOUNTER — OFFICE VISIT - HEALTHEAST (OUTPATIENT)
Dept: FAMILY MEDICINE | Facility: CLINIC | Age: 51
End: 2021-02-08

## 2021-02-08 DIAGNOSIS — M54.50 LOW BACK PAIN: ICD-10-CM

## 2021-02-08 DIAGNOSIS — M54.50 ACUTE MIDLINE LOW BACK PAIN WITHOUT SCIATICA: ICD-10-CM

## 2021-02-08 LAB — HCG UR QL: NEGATIVE

## 2021-02-08 RX ORDER — LORATADINE 10 MG/1
10 TABLET ORAL DAILY
Status: SHIPPED | COMMUNITY
Start: 2021-02-08

## 2021-03-04 ENCOUNTER — COMMUNICATION - HEALTHEAST (OUTPATIENT)
Dept: FAMILY MEDICINE | Facility: CLINIC | Age: 51
End: 2021-03-04

## 2021-03-15 ENCOUNTER — COMMUNICATION - HEALTHEAST (OUTPATIENT)
Dept: FAMILY MEDICINE | Facility: CLINIC | Age: 51
End: 2021-03-15

## 2021-05-26 ENCOUNTER — RECORDS - HEALTHEAST (OUTPATIENT)
Dept: ADMINISTRATIVE | Facility: CLINIC | Age: 51
End: 2021-05-26

## 2021-05-27 NOTE — PROGRESS NOTES
Assessment:     1. Routine general medical examination at a health care facility    2. Thyroid enlarged    3. Vitamin D deficiency    4. Iron deficiency anemia secondary to inadequate dietary iron intake    5. Other fatigue    6. Visit for screening mammogram    7. Lipid screening    8. Family history of B12 deficiency    9. Screening for diabetes mellitus        Plan:      1. Routine general medical examination at a health care facility  -Routine health maintenance discussion:  No smoking, limited alcohol (7 or less servings per week), 5 fruits/veg servings per day, 200 minutes of exercise per week.  Daily calcium/vitamin D guidelines, bone health, colon cancer screening beginning at age 50.  Accident avoidance, sun screen.   -Deferred the back pain, her exam is normal in general today and she does have a workers comp information that she will work through for further evaluation and assessment.  Recommended she call them when she is agreeable to doing.    2. Thyroid enlarged  -There is seems somewhat enlarged on exam today, referral for ultrasound placed and thyroid function testing done.  - US Thyroid; Future  - T4, Free; Future  - Thyroid Stimulating Hormone (TSH); Future    3. Vitamin D deficiency  -Has had low vitamin D in the past as well, updating when she returns for fasting labs.  - Vitamin D, Total (25-Hydroxy); Future    4. Iron deficiency anemia secondary to inadequate dietary iron intake  -Due to menorrhagia she has had low hemoglobin in the past, updating her iron stores as well as her hemoglobin to ensure these are remaining adequately replaced.  - HM2(CBC w/o Differential); Future  - Ferritin; Future  - Iron and Transferrin Iron Binding Capacity; Future    5. Other fatigue  -Has been fatigued in the past, updating her electrolyte panel  - Comprehensive Metabolic Panel; Future    6. Visit for screening mammogram  - Mammo Screening Bilateral; Future    7. Lipid screening  - Lipid Cascade; Future    8.  Family history of B12 deficiency  -Several family members have B12 deficiency, updating level with her future blood draw  - Vitamin B12; Future    9. Screening for diabetes mellitus  - Glycosylated Hemoglobin A1c; Future    10.  Allergic rhinitis  -Okay for Flonase referrals for the year.       Subjective:      Renetta Joyner is a 49 y.o. female who presents for an annual exam. The patient is sexually active. The patient participates in regular exercise: yes. The patient reports that there is not domestic violence in her life.     She has had some back pain. She did fall on some ice about 3 weeks ago. She does have some pain with sitting and standing. She has been using a heating pad and ice. She does stand at work. Work does know about it. 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 She is wondering if she should go through with this. She did take ibuprofen the first few days and then she has not.     She does note that her period is getting closer together.     She does do albuterol only as needed with coughing or respiratory illnesses.  She uses the Flonase seasonally.     Healthy Habits:   Regular Exercise: Yes, 3 - 4 days a week.   Sunscreen Use: Yes  Healthy Diet: Yes  Dental Visits Regularly: Yes  Seat Belt: Yes  Sexually active: Yes  Self Breast Exam Monthly:Yes  Hemoccults: N/A  Flex Sig: N/A  Colonoscopy: N/A  Lipid Profile: No  Glucose Screen: No  Prevention of Osteoporosis: Yes  Last Dexa: N/A  Guns at Home:  No      Immunization History   Administered Date(s) Administered     DT (pediatric) 11/01/1998     Hep A, Adult IM (19yr & older) 10/24/2017     Hep A, historic 03/01/2011, 11/22/2011     Hep B, Adult 10/24/2017     Influenza, inj, historic,unspecified 10/08/2017     Influenza, seasonal,quad inj 6-35 mos 12/26/2012, 11/03/2013     Influenza,seasonal quad, PF, 36+MOS 09/28/2015     Meningococcal MCV4P 10/24/2017     Td,adult,historic,unspecified  10/18/2007     Tdap 10/18/2007, 2018     Immunization status: up to date and documented.    No exam data present    Gynecologic History  Patient's last menstrual period was 2019 (approximate).  Contraception: tubal ligation  Last Pap: 10/22/18. Results were: normal  Last mammogram: 3/2/18. Results were: normal      OB History    Para Term  AB Living   4 4 4 0 0 4   SAB TAB Ectopic Multiple Live Births   0 0 0 0        # Outcome Date GA Lbr Brad/2nd Weight Sex Delivery Anes PTL Lv   4 Term            3 Term            2 Term            1 Term                   Current Outpatient Medications   Medication Sig Dispense Refill     albuterol (PROAIR HFA;PROVENTIL HFA;VENTOLIN HFA) 90 mcg/actuation inhaler Inhale 2 puffs every 6 (six) hours as needed for wheezing. 18 g 1     cholecalciferol, vitamin D3, (VITAMIN D3) 2,000 unit cap Take by mouth.       ferrous sulfate (IRON, FERROUS SULFATE,) 325 (65 FE) MG tablet Take 1 tablet by mouth daily with breakfast.       fluticasone (FLONASE) 50 mcg/actuation nasal spray 2 sprays into each nostril daily. 48 g 3     loratadine (CLARITIN) 10 mg tablet Take 10 mg by mouth daily.       No current facility-administered medications for this visit.      Past Medical History:   Diagnosis Date     Abdominal pain, left lower quadrant      Cough      History of pregnancy      - One  5/15/00, then 3 C-sections     Impetigo      Iron deficiency anemia secondary to inadequate dietary iron intake      Nonspecific abnormal results of thyroid function study      Other disorder of menstruation and other abnormal bleeding from female genital tract      Unspecified vitamin D deficiency     resolved     Past Surgical History:   Procedure Laterality Date      SECTION  2002    Son Marley      SECTION  2006    Daughter Adam      SECTION  2009    Daughter Carrie.      TUBAL LIGATION  2009    With last      Cat  silvana  Family History   Problem Relation Age of Onset     Asthma Father      Breast cancer Maternal Aunt 52     Breast cancer Maternal Aunt 67     Breast cancer Cousin 40     Pernicious anemia Brother         2 brothers with B12 deficiency, not known if pernicious anemia.     Social History     Socioeconomic History     Marital status:      Spouse name: Ignacio Mckeon     Number of children: 4     Years of education: Not on file     Highest education level: Not on file   Occupational History     Occupation: Realtor   Social Needs     Financial resource strain: Not on file     Food insecurity:     Worry: Not on file     Inability: Not on file     Transportation needs:     Medical: Not on file     Non-medical: Not on file   Tobacco Use     Smoking status: Never Smoker     Smokeless tobacco: Never Used   Substance and Sexual Activity     Alcohol use: No     Drug use: No     Sexual activity: Yes     Partners: Male     Birth control/protection: Surgical     Comment: Tubal ligation   Lifestyle     Physical activity:     Days per week: Not on file     Minutes per session: Not on file     Stress: Not on file   Relationships     Social connections:     Talks on phone: Not on file     Gets together: Not on file     Attends Muslim service: Not on file     Active member of club or organization: Not on file     Attends meetings of clubs or organizations: Not on file     Relationship status: Not on file     Intimate partner violence:     Fear of current or ex partner: Not on file     Emotionally abused: Not on file     Physically abused: Not on file     Forced sexual activity: Not on file   Other Topics Concern     Not on file   Social History Narrative    Immigrant first-generation. Originally from [place].   with 4 children - daughter Janice 5/15/00, son Marley 5/8/02, daughter Adam 12/21/06, and daughter Carrie 11/20/09.   and children's last name is Mike.       Review of Systems  Review of Systems    "   With the exception of the aforementioned issues, 12 point, comprehensive ROS was done and was negative.     Objective:         Vitals:    04/03/19 1359   BP: 112/74   Pulse: 86   SpO2: 99%   Weight: 175 lb 3.2 oz (79.5 kg)   Height: 5' 2.8\" (1.595 m)     Body mass index is 31.23 kg/m .    Physical  Physical Exam     Gen: Well developed, well nourished, no acute distress.  HEENT: normocephalic/atraumatic, PERRLA/EOMI, TMs: Gray, normal light reflex, no nasal discharge.  Oral mucosa: no erythema/exudate  Neck: No LAD/masses, thyroid gland appears somewhat enlarged, left > right  Lungs: clear bilaterally  Heart: regular rate and rhythm, no murmurs/gallops/rubs  Breasts: symmetric, no masses/skin changes, nipple discharge, or axillary LAD.  BSE reviewed.  Abdomen: Normal bowel sounds, soft, non-tender, non-distended, no masses, neg Huizar's/McBurney's, no rebound/guarding  Genital: deferred, done in October 2018  Lymphatics: no supraclavicular/axillary/epitrochlear/inguinal LAD. No edema.  Neuro: A&O x 3, CN II-XII intact, strength 5/5, reflexes symmetric, sensory intact to light touch.  Psych: Behavior appropriate, engaging.  Thought processes congruent, non-tangential.  Musculoskeletal: no gross deformities.  Skin: no rashes or lesions.     "

## 2021-05-30 ENCOUNTER — RECORDS - HEALTHEAST (OUTPATIENT)
Dept: ADMINISTRATIVE | Facility: CLINIC | Age: 51
End: 2021-05-30

## 2021-05-31 ENCOUNTER — RECORDS - HEALTHEAST (OUTPATIENT)
Dept: ADMINISTRATIVE | Facility: CLINIC | Age: 51
End: 2021-05-31

## 2021-05-31 VITALS — WEIGHT: 174 LBS | BODY MASS INDEX: 30.83 KG/M2 | HEIGHT: 63 IN

## 2021-05-31 VITALS — HEIGHT: 63 IN | WEIGHT: 174.6 LBS | BODY MASS INDEX: 30.94 KG/M2

## 2021-06-01 ENCOUNTER — RECORDS - HEALTHEAST (OUTPATIENT)
Dept: ADMINISTRATIVE | Facility: CLINIC | Age: 51
End: 2021-06-01

## 2021-06-01 VITALS — BODY MASS INDEX: 31.01 KG/M2 | WEIGHT: 175 LBS | HEIGHT: 63 IN

## 2021-06-02 VITALS — BODY MASS INDEX: 30.57 KG/M2 | WEIGHT: 172.6 LBS

## 2021-06-02 VITALS — BODY MASS INDEX: 30.15 KG/M2 | WEIGHT: 170.2 LBS

## 2021-06-02 VITALS — BODY MASS INDEX: 30.19 KG/M2 | WEIGHT: 170.4 LBS

## 2021-06-02 VITALS — WEIGHT: 169.38 LBS | BODY MASS INDEX: 30.01 KG/M2 | HEIGHT: 63 IN

## 2021-06-02 VITALS — WEIGHT: 175.2 LBS | BODY MASS INDEX: 31.04 KG/M2 | HEIGHT: 63 IN

## 2021-06-04 VITALS
SYSTOLIC BLOOD PRESSURE: 126 MMHG | WEIGHT: 169.13 LBS | HEART RATE: 104 BPM | RESPIRATION RATE: 16 BRPM | OXYGEN SATURATION: 99 % | BODY MASS INDEX: 28.88 KG/M2 | TEMPERATURE: 99.6 F | DIASTOLIC BLOOD PRESSURE: 80 MMHG | HEIGHT: 64 IN

## 2021-06-04 VITALS
WEIGHT: 171.6 LBS | BODY MASS INDEX: 30.41 KG/M2 | HEIGHT: 63 IN | OXYGEN SATURATION: 97 % | SYSTOLIC BLOOD PRESSURE: 98 MMHG | HEART RATE: 78 BPM | DIASTOLIC BLOOD PRESSURE: 60 MMHG

## 2021-06-04 VITALS
TEMPERATURE: 98.7 F | SYSTOLIC BLOOD PRESSURE: 106 MMHG | BODY MASS INDEX: 29.86 KG/M2 | DIASTOLIC BLOOD PRESSURE: 70 MMHG | WEIGHT: 167.5 LBS

## 2021-06-05 ENCOUNTER — RECORDS - HEALTHEAST (OUTPATIENT)
Dept: FAMILY MEDICINE | Facility: CLINIC | Age: 51
End: 2021-06-05

## 2021-06-05 VITALS
BODY MASS INDEX: 30.36 KG/M2 | HEART RATE: 76 BPM | SYSTOLIC BLOOD PRESSURE: 112 MMHG | DIASTOLIC BLOOD PRESSURE: 72 MMHG | OXYGEN SATURATION: 99 % | WEIGHT: 171.4 LBS

## 2021-06-05 VITALS
BODY MASS INDEX: 30.29 KG/M2 | SYSTOLIC BLOOD PRESSURE: 112 MMHG | HEART RATE: 87 BPM | OXYGEN SATURATION: 99 % | WEIGHT: 171 LBS | DIASTOLIC BLOOD PRESSURE: 72 MMHG

## 2021-06-05 DIAGNOSIS — N83.209 OTHER AND UNSPECIFIED OVARIAN CYST: ICD-10-CM

## 2021-06-05 NOTE — PROGRESS NOTES
ASSESSMENT/PLAN:  Itchy skin, xerosis  Advised skin moisturizer 3 times a day  Continue with Claritin  Increase ferrous sulfate to 3 times a day  negative wet prep  Stop the scratch itch cycle  Follow-up with primary care provider in 1 month  -     HM2(CBC w/o Differential)  -     Ferritin  -     Comprehensive Metabolic Panel  -     Wet Prep, Vaginal    Allergic state, sequela  Continue with Claritin    Iron deficiency anemia, unspecified iron deficiency anemia type  -     HM2(CBC w/o Differential)  -     Ferritin  I asked her to increase ferrous sulfate to 3 times a day and follow-up with her primary care provider in 1 month      Orders Placed This Encounter   Procedures     Wet Prep, Vaginal     HM2(CBC w/o Differential)     Ferritin     Comprehensive Metabolic Panel     CHIEF COMPLAINT:  Chief Complaint   Patient presents with     Vaginal Itching     ongoing for about 4wks no pain with urination      HISTORY OF PRESENT ILLNESS:  Renetta is a 49 y.o. female presenting to the clinic today for generalized itchiness. She has been experiencing generalized itchiness that is worse in her vaginal area for 5 weeks. The vaginal itchiness is external. She has not started using any new detergents, soaps, supplements, diet or medications. She recently returned from [place] 2 weeks ago. There is no one at home or at work complaining of similar symptoms. There is no rash present. She denies any vaginal discharge or urinary symptoms. She has been using Aveeno lotion and baby oil which will give her relief for 2 hours at a time. She started taking Claritin today after not taking it for 2 months. She is also taking ferrous sulfate 325 mg once a day and vitamin D 2,000 units once a day. Her last ferritin level was 8 ng/mL on 04/09/19.     REVIEW OF SYSTEMS:   Constitutional: Negative.   HENT: Negative.   Eyes: Negative.   Respiratory: Negative.   Cardiovascular: Negative.   Gastrointestinal: Negative.   Endocrine: Negative.    Genitourinary: Negative.   Musculoskeletal: Negative.   Skin: Negative.   Allergic/Immunologic: Negative.   Neurological: Negative.   Hematological: Negative.   Psychiatric/Behavioral: Negative.   All other systems are negative.    TOBACCO USE:  Social History     Tobacco Use   Smoking Status Never Smoker   Smokeless Tobacco Never Used     VITALS:  Vitals:    01/29/20 1150   BP: 106/70   Temp: 98.7  F (37.1  C)   TempSrc: Oral   Weight: 167 lb 8 oz (76 kg)     Wt Readings from Last 3 Encounters:   01/29/20 167 lb 8 oz (76 kg)   04/03/19 175 lb 3.2 oz (79.5 kg)   02/11/19 172 lb 9.6 oz (78.3 kg)     PHYSICAL EXAM:  Constitutional: Patient is oriented to person, place, and time. Patient appears well-developed and well-nourished. No distress.   Head: Normocephalic and atraumatic.   Right Ear: External ear normal.   Left Ear: External ear normal.   Pelvic:  Excoriation of the labia majora bilaterally with normal vulva.  Normal vagina with no polyps or lesions and with physiologic discharge.  Normal cervix with normal mucosa and without CMT.  No adnexal masses.   Neurological: Patient is alert and oriented to person, place, and time. Patient has normal reflexes. No cranial nerve deficit. Coordination normal.   Skin: Skin is warm and dry. No rash or lesions noted. Patient is not diaphoretic. No erythema. No pallor. There are excoriation marks from itching.     Results for orders placed or performed in visit on 01/29/20   HM2(CBC w/o Differential)   Result Value Ref Range    WBC 6.9 4.0 - 11.0 thou/uL    RBC 4.25 3.80 - 5.40 mill/uL    Hemoglobin 10.0 (L) 12.0 - 16.0 g/dL    Hematocrit 31.3 (L) 35.0 - 47.0 %    MCV 74 (L) 80 - 100 fL    MCH 23.5 (L) 27.0 - 34.0 pg    MCHC 31.9 (L) 32.0 - 36.0 g/dL    RDW 12.8 11.0 - 14.5 %    Platelets 277 140 - 440 thou/uL    MPV 7.8 7.0 - 10.0 fL         ADDITIONAL HISTORY SUMMARIZED (2): Reviewed physical April 2019.  DECISION TO OBTAIN EXTRA INFORMATION (1): None.   RADIOLOGY TESTS  (1): None.  LABS (1): Reviewed labs from 04/09/19 and ordered labs today.   MEDICINE TESTS (1): None.  INDEPENDENT REVIEW (2 each): None.     IMary, am scribing for and in the presence of, Dr. Morfin.    I, Dr. Morfin, personally performed the services described in this documentation, as scribed by Mary Cannon in my presence, and it is both accurate and complete.    MEDICATIONS:  Current Outpatient Medications   Medication Sig Dispense Refill     ferrous sulfate (IRON, FERROUS SULFATE,) 325 (65 FE) MG tablet Take 1 tablet by mouth daily with breakfast.       loratadine (CLARITIN) 10 mg tablet Take 10 mg by mouth daily.       albuterol (PROAIR HFA;PROVENTIL HFA;VENTOLIN HFA) 90 mcg/actuation inhaler Inhale 2 puffs every 6 (six) hours as needed for wheezing. 18 g 1     cholecalciferol, vitamin D3, (VITAMIN D3) 2,000 unit cap Take by mouth.       fluticasone (FLONASE) 50 mcg/actuation nasal spray 2 sprays into each nostril daily. 48 g 3     No current facility-administered medications for this visit.        Total data points:3

## 2021-06-07 NOTE — PROGRESS NOTES
"Renetta Joyner is a 50 y.o. female who is being evaluated via a billable telephone visit.      The patient has been notified of following:     \"This telephone visit will be conducted via a call between you and your physician/provider. We have found that certain health care needs can be provided without the need for a physical exam.  This service lets us provide the care you need with a short phone conversation.  If a prescription is necessary we can send it directly to your pharmacy.  If lab work is needed we can place an order for that and you can then stop by our lab to have the test done at a later time.    Telephone visits are billed at different rates depending on your insurance coverage. During this emergency period, for some insurers they may be billed the same as an in-person visit.  Please reach out to your insurance provider with any questions.    If during the course of the call the physician/provider feels a telephone visit is not appropriate, you will not be charged for this service.\"    Patient has given verbal consent to a Telephone visit? Yes    Patient would like to receive their AVS by AVS Preference: Ashley.    Additional provider notes: insert own note template here     Renetta scheduled a telemedicine visit today to follow-up on itchy skin.  She continues to struggle quite a bit with persistent itching.  It is most bothersome at nighttime when she goes to sleep as well as when she changes her clothes.  It can be a little bit bothersome in the morning as well but typically does not bother her through the day.  She uses lotion and baby oil 3 times daily, she uses nothing was since, she is tried no new detergents or soaps.  She does take Claritin daily and has a known allergy to cats but has had cats well before she had any onset of her itching.  She does let her cats into her bedroom however.  Interestingly she has recently been tested for B12 levels and was deficient, has a history of vitamin D " deficiency and a longstanding history of iron deficiency that was thought to be secondary to poor iron intake as well as heavy periods.    The patient states her menstrual periods are still very regular, happening once a month and lasting for 5 to 6 days.  She denies that they are heavy.  She denies being a vegetarian.  She is [place] in descent.  She believes her mother went through menopause around age 54 or 55.  She has been evaluated previously by ENT but has never had an EGD from what she remembers.    Assessment/Plan:  1. Itchy skin  -She has not yet been evaluated by dermatologist and we discussed that this certainly could be secondary to her cat allergy.  I recommended she start taking Zyrtec rather than Claritin to see if this helps with her symptoms, to remove the cats from her bedroom to avoid this exacerbating her symptoms as well.  If these things are not helping I have placed a dermatology referral for her to follow-up with them.  Additionally given her symptoms of itching as well as her vitamin B12 deficiency, vitamin D deficiency and profound iron deficiency I am going to check a celiac antibody panel as well to see if this looks grossly abnormal.  She may need to have an EGD as well as a colonoscopy for further evaluation.  - Celiac(Gluten)Antibody Panel; Future  - Erythrocyte Sedimentation Rate; Future  - Ambulatory referral to Dermatology; Future    2. Iron deficiency anemia secondary to inadequate dietary iron intake  -The patient takes iron supplementation 3 times daily and has had for several years.  She denies that her periods are overly heavy, and she may need to have a further GI work-up to evaluate for other reasons for her anemia.  Updating labs here in the next several days, and if her celiac panel is notable for possible celiac disease or if she has no resolution of her iron deficiency we may need to refer for EGD and colonoscopy.  - HM1 (CBC and Differential); Future  - Ferritin;  Future  - Iron and Transferrin Iron Binding Capacity; Future    3. Vitamin B12 deficiency (non anemic)  -Updating levels to ensure that her replacement has been adequate, again may need to consider celiac disease  - Vitamin B12; Future    4. Vitamin D deficiency  - Vitamin D, Total (25-Hydroxy); Future        Phone call duration:  15 minutes    Nancy Mata MD

## 2021-06-07 NOTE — TELEPHONE ENCOUNTER
Attempted to call, no answer, left voice mail that we would call later today:     Her labs were all normal showing no issues with celiac disease. Her hemoglobin and iron were back to normal with the iron that she is taking as well. This is all good news.     I'd encourage her to get scheduled with dermatology to talk about the skin issues more.

## 2021-06-07 NOTE — TELEPHONE ENCOUNTER
Test Results  Who is calling?:  patient  Who ordered the test:  Nancy Mata MD  Type of test: Lab  Date of test:  4/21/2020  Where was the test performed:  [place]  What are your questions/concerns?:  Requesting results, please advise!  Okay to leave a detailed message?:  Yes

## 2021-06-09 NOTE — PROGRESS NOTES
"  SUBJECTIVE: Renetta Joyner is a 50 y.o.  female who presents today with a history of 1 day of burning with urination.  She has never had a UTI besides one many years ago.  She does not have a history of kidney stones.  She noticed some blood in the urine.  She has no allergy to medicines.    OBJECTIVE: /80   Pulse (!) 104   Temp 99.6  F (37.6  C)   Resp 16   Ht 5' 4\" (1.626 m)   Wt 169 lb 2 oz (76.7 kg)   SpO2 99%   Breastfeeding No   BMI 29.03 kg/m    General: Well-appearing middle-aged overweight female in no acute distress  Heart: Regular rate and rhythm without murmur  Lungs: Clear bilaterally  Abdomen: Soft, nontender in the suprapubic area and no tenderness with percussion of the kidneys    UA came back positive with large amount of blood, I 100 mg/dL of protein and small amount of leukocyte esterase    ASSESSMENT & PLAN:     1. Acute cystitis with hematuria  I will treat her empirically with Bactrim DS.  When the culture and sensitivity results come back I will let her know either way if her antibiotic is adequate.  - sulfamethoxazole-trimethoprim (BACTRIM DS) 800-160 mg per tablet; Take 1 tablet by mouth 2 (two) times a day for 7 days.  Dispense: 14 tablet; Refill: 0    2. Frequent urination  - Urinalysis Macroscopic  - Culture, Urine    Her antibiotic will be switched if sensitivities show the need.  She can follow-up with her primary at their usual interval.    Patient Active Problem List   Diagnosis     Other fatigue     Glossodynia     Allergic Rhinitis     Iron Deficiency Anemia Secondary To Inadequate Dietary Iron Intake     Carpal Tunnel Syndrome     Ganglion     Menorrhagia     Ovarian Cyst     Vitamin D deficiency     Foot pain, right       Current Outpatient Medications on File Prior to Visit   Medication Sig Dispense Refill     cholecalciferol, vitamin D3, (VITAMIN D3) 2,000 unit cap Take by mouth.       ferrous sulfate (IRON, FERROUS SULFATE,) 325 (65 FE) MG tablet Take 1 " tablet by mouth 3 (three) times a day with meals.        loratadine (CLARITIN) 10 mg tablet Take 10 mg by mouth daily.       No current facility-administered medications on file prior to visit.

## 2021-06-10 NOTE — PROGRESS NOTES
Assessment:   .  1. Routine general medical examination at a health care facility    2. Iron deficiency anemia secondary to inadequate dietary iron intake    3. Vitamin B12 deficiency (non anemic)    4. Itchy skin    5. Vitamin D deficiency    6. Lipid screening    7. Screening for diabetes mellitus        Plan:      1. Routine general medical examination at a health care facility  -Routine health maintenance discussion:  No smoking, limited alcohol (7 or less servings per week), 5 fruits/veg servings per day, 200 minutes of exercise per week.  Daily calcium/vitamin D guidelines, bone health, colon cancer screening beginning at age 50.  Accident avoidance, sun screen.     2. Iron deficiency anemia secondary to inadequate dietary iron intake  -Doing well at this time with most recent check in April showing normal labs.  Discussed the possibility of rechecking labs in the next several months that she is off of her vitamins at this time, she will see how she is feeling.  - HM1(CBC and Differential); Future  - Iron and Transferrin Iron Binding Capacity; Future    3. Vitamin B12 deficiency (non anemic)  -Recently stopped her B12 due to the itching skin.  Had been low previously, discussed may be updating her labs in the next several months depending on how she is feeling.  - Vitamin B12; Future    4. Itchy skin  -Continuing to work with dermatology    5. Vitamin D deficiency  -Has been low previously, most recent test in February was normal.  As she discontinued her supplementation consider rechecking labs in the next several months  - Vitamin D, Total (25-Hydroxy); Future    6. Lipid screening  -Historically has been good  - Lipid Waller; Future    7. Screening for diabetes mellitus  - Comprehensive Metabolic Panel; Future       Subjective:      Renetta Joyner is a 50 y.o. female who presents for an annual exam. The patient is sexually active. The patient participates in regular exercise: no. The patient reports that  there is not domestic violence in her life.     She is doing well generally. She is not taking any medicine for the last few weeks to see if this helsps with her itching, does seem a bit better now.     She has been seeing the dermatologist for her itching, she is going to go to do some patch testing next month in [place]. She is still using the non- fragrance lotion, soaps, shampoo etc. She is taking Allegra only every few days.     Her periods are still there, does miss at times. It's not terribly heavy. She does not have any missed periods.     Healthy Habits:   Regular Exercise: No she does walk 1 hour daily  Sunscreen Use: Yes  Healthy Diet: Yes  Dental Visits Regularly: Yes  Seat Belt: Yes  Sexually active: Yes  Self Breast Exam Monthly:Yes  Hemoccults: N/A  Flex Sig: N/A  Colonoscopy: N/A  Lipid Profile: No  Glucose Screen: No  Prevention of Osteoporosis: Yes  Last Dexa: N/A  Guns at Home:  No      Immunization History   Administered Date(s) Administered     DT (pediatric) 1998     Hep A, Adult IM (19yr & older) 10/24/2017     Hep A, historic 2011, 2011     Hep B, Adult 10/24/2017     INFLUENZA,SEASONAL QUAD, PF, =/> 6months 2015     Influenza, inj, historic,unspecified 10/08/2017     Influenza, seasonal,quad inj 6-35 mos 2012, 2013     Meningococcal MCV4P 10/24/2017     Td,adult,historic,unspecified 10/18/2007     Tdap 10/18/2007, 2018     Immunization status: up to date and documented.    No exam data present    Gynecologic History  Patient's last menstrual period was 2020 (exact date).  Contraception: tubal ligation  Last Pap: 10/22/2018. Results were: normal  Last mammogram: 2020. Results were: normal      OB History    Para Term  AB Living   4 4 4 0 0 4   SAB TAB Ectopic Multiple Live Births   0 0 0 0        # Outcome Date GA Lbr Brad/2nd Weight Sex Delivery Anes PTL Lv   4 Term            3 Term            2 Term            1 Term                 Current Outpatient Medications   Medication Sig Dispense Refill     cholecalciferol, vitamin D3, (VITAMIN D3) 2,000 unit cap Take by mouth.       ferrous sulfate (IRON, FERROUS SULFATE,) 325 (65 FE) MG tablet Take 1 tablet by mouth 3 (three) times a day with meals.        fexofenadine (ALLEGRA ALLERGY) 60 MG tablet Take 60 mg by mouth as needed.       loratadine (CLARITIN) 10 mg tablet Take 10 mg by mouth daily.       No current facility-administered medications for this visit.      Past Medical History:   Diagnosis Date     Abdominal pain, left lower quadrant      Cough      History of pregnancy      - One  5/15/00, then 3 C-sections     Impetigo      Iron deficiency anemia secondary to inadequate dietary iron intake      Nonspecific abnormal results of thyroid function study      Other disorder of menstruation and other abnormal bleeding from female genital tract      Unspecified vitamin D deficiency     resolved     Past Surgical History:   Procedure Laterality Date      SECTION  2002    Son Marley      SECTION  2006    Daughter Adam      SECTION  2009    Daughter Carrie.      TUBAL LIGATION  2009    With last      Cat braulioder  Family History   Problem Relation Age of Onset     Asthma Father      Breast cancer Maternal Aunt 52     Breast cancer Maternal Aunt 67     Breast cancer Cousin 40     Pernicious anemia Brother         2 brothers with B12 deficiency, not known if pernicious anemia.     Social History     Socioeconomic History     Marital status:      Spouse name: Ignacio Mckeon     Number of children: 4     Years of education: Not on file     Highest education level: Not on file   Occupational History     Occupation: Realtor   Social Needs     Financial resource strain: Not on file     Food insecurity     Worry: Not on file     Inability: Not on file     Transportation needs     Medical: Not on file     Non-medical: Not on file  "  Tobacco Use     Smoking status: Never Smoker     Smokeless tobacco: Never Used   Substance and Sexual Activity     Alcohol use: No     Drug use: No     Sexual activity: Yes     Partners: Male     Birth control/protection: Surgical     Comment: Tubal ligation   Lifestyle     Physical activity     Days per week: Not on file     Minutes per session: Not on file     Stress: Not on file   Relationships     Social connections     Talks on phone: Not on file     Gets together: Not on file     Attends Anglican service: Not on file     Active member of club or organization: Not on file     Attends meetings of clubs or organizations: Not on file     Relationship status: Not on file     Intimate partner violence     Fear of current or ex partner: Not on file     Emotionally abused: Not on file     Physically abused: Not on file     Forced sexual activity: Not on file   Other Topics Concern     Not on file   Social History Narrative    Immigrant first-generation. Originally from [place].   with 4 children - daughter Janice 5/15/00, son Marley 5/8/02, daughter Adam 12/21/06, and daughter Carrie 11/20/09.   and children's last name is Mike.       Review of Systems  Review of Systems      With the exception of the aforementioned issues, 12 point, comprehensive ROS was done and was negative.     Objective:         Vitals:    08/26/20 1401   BP: 98/60   Pulse: 78   SpO2: 97%   Weight: 171 lb 9.6 oz (77.8 kg)   Height: 5' 3\" (1.6 m)     Body mass index is 30.4 kg/m .    Physical  Physical Exam     Gen: Well developed, well nourished, no acute distress.  HEENT: normocephalic/atraumatic, PERRLA/EOMI, TMs: Gray, normal light reflex, no nasal discharge.  Oral mucosa: no erythema/exudate  Neck: No LAD/masses/thyromegaly  Lungs: clear bilaterally  Heart: regular rate and rhythm  Breasts: symmetric, no masses/skin changes, nipple discharge, or axillary LAD.  BSE reviewed.  Abdomen: Normal bowel sounds, soft, non-tender, " non-distended, no masses, neg Huizar's/McBurney's, no rebound/guarding  Genital: Deferred  Lymphatics: no supraclavicular/axillary/epitrochlear/inguinal LAD. No edema.  Neuro: A&O x 3, CN II-XII intact, strength 5/5, reflexes symmetric, sensory intact to light touch.  Psych: Behavior appropriate, engaging.  Thought processes congruent, non-tangential.  Musculoskeletal: no gross deformities.  Skin: Signs of excoriation on upper extremities and torso

## 2021-06-12 NOTE — PROGRESS NOTES
Assessment/Plan:           Problem List Items Addressed This Visit            Iron Deficiency Anemia Secondary To Inadequate Dietary Iron Intake     Vitamin D deficiency                   Other Visit Diagnoses       Right foot pain - Primary    Relevant Orders    Ambulatory referral to Podiatry    XR Ankle Right 3 or More VWS    XR Foot Right 3 or More VWS (Completed)    Pain of right heelPatient has pain lateral aspect right ankle and pain in heel and underneath her right foot, for the past week, worse with walking and going up and down steps. Also discussed referring her for an x-ray, although I do not think that we will see much. Pain appears to be related to tendons.     Relevant Orders    Ambulatory referral to Podiatry    XR Ankle Right 3 or More VWS    Dysuria, patient also complains of dysuria, so a urine analysis will be done.     Relevant Orders    Urinalysis-UC if Indicated (Completed)    Culture, Urine (Completed)    Urinary frequency, as above.     Relevant Orders    Urinalysis-UC if Indicated (Completed)    Culture, Urine (Completed)    Lipid screening, patient requested this to be done.     Screening for diabetes mellitus, patient requested this be done.     Vitamin B12 deficiency (non anemic) , this has been very low in the past, at 149 April 9, 2019. More than 2000 April 21, 2020.        History of vitamin D deficiency, vitamin D will be checked today. She is on vitamin D 2000 units daily.   Also iron deficiency anemia, she is on ferrous sulfate 325 mg 3 times a day. Vitamin B12 has been low in the past. Patient is not currently on vitamin B12. Hemoglobin was 14.1 May 21, 2020, this was low at 10, January 29, 2020. Repeating CBC today. MCV low at 74 January 28, 2020. January 29, 2020, ferritin less than 2. April 21, 2020 ferritin 11. Plan to have her continue with her iron replacement. Patient should probably also continue with vitamin B12. I cannot see if she has had B12 injections in the past, or  if she had oral tablets.   Patient has not seen her primary care physician, Dr. Nancy Mata, at [facility]. Main complaint has been itchiness, which is persistent. Review of her record, liver enzymes have been normal.   Subjective:   Patient ID: Renetta Joyner is a 50 y.o. female.   HPI   Right foot pain times a week. Pain inferior to the right lateral malleolus. Also pain over the heel. No history of trauma. She has been taking ibuprofen for the pain, 1 tablet twice a day.   The following portions of the patient's history were reviewed and updated as appropriate:   She has a past medical history of Abdominal pain, left lower quadrant, Cough, History of pregnancy, Impetigo, Iron deficiency anemia secondary to inadequate dietary iron intake, Nonspecific abnormal results of thyroid function study, Other disorder of menstruation and other abnormal bleeding from female genital tract, and Unspecified vitamin D deficiency.   She does not have any pertinent problems on file.   She has a past surgical history that includes Tubal ligation (2009);  section (2002);  section (2006); and  section (2009).   Her family history includes Asthma in her father; Breast cancer (age of onset: 40) in her cousin; Breast cancer (age of onset: 52) in her maternal aunt; Breast cancer (age of onset: 67) in her maternal aunt; Pernicious anemia in her brother.   She reports that she has never smoked. She has never used smokeless tobacco. She reports that she does not drink alcohol or use drugs.          Current Outpatient Medications   Medication Sig Dispense Refill     cholecalciferol, vitamin D3, (VITAMIN D3) 2,000 unit cap Take by mouth.       ferrous sulfate (IRON, FERROUS SULFATE,) 325 (65 FE) MG tablet Take 1 tablet by mouth 3 (three) times a day with meals.        fexofenadine (ALLEGRA ALLERGY) 60 MG tablet Take 60 mg by mouth as needed.       No current  facility-administered medications for this visit.        Review of Systems   Right foot pain as described above. No other symptoms. Patient has some dysuria and urinary frequency, and asks if this cane be checked. No fever.     Rest of the review of systems is negative.        /72   Pulse 87   Wt 171 lb (77.6 kg)   SpO2 99%   BMI 30.29 kg/m      Objective:    Physical Exam   Patient is not distressed. There is some soft tissue swelling inferior to lateral right foot malleolus. No warmth or tenderness. Good movement of the ankle joint and subtalar joint. No warmth or swelling of her ankle joint. She describes pain over her heel and sometimes under her sole, near arch. Flat footed.

## 2021-06-13 NOTE — PROGRESS NOTES
The xray of your foot has not shown any abnormality. We cannot see tendons or muscles on xray, and your pain may be related to those instead of the bones. Podiatry will be able to examine you and tell you what they think.     Benita Giraldo

## 2021-06-13 NOTE — PROGRESS NOTES
1. Travel advice encounter  Typhoid, Inactive, Inj    Hepatitis A adult 2 dose IM (19 yr & older)    Hepatitis B Vaccine Age 20 years and above    Meningococcal MCV4P         ASSESSMENT/PLAN:     The following high BMI interventions were performed this visit: encouragement to exercise and weight monitoring    Return to clinic if patient develops symptoms upon returning back from [place] and [facility].  Reviewed recommended vaccinations per the Center for disease control with patient today.  Patient's departure date is December 2 and is planning to return on December 26.  She will be staying with friends and family.    The visit lasted a total of 25 minutes face to face with the patient.  Over 50% of the time spent counseling and educating the patient about the above content.      Kaylee Amado NP          SUBJECTIVE:  Renetta Joyner is a 47 y.o. female presents for travel visit today.  Patient plans on visiting [facility] in [facility] to visit family and friends.  Plans on departing December 2, 2017.  Return date is December 26, 2017.  She recently had her influenza vaccination on October 8, 2017.  We did review travel guidelines per the CDC website.  Hepatitis A and B, typhoid and meningitis vaccinations will be updated today.  Patient [place] area.  She denies having any issues in the past with traveler's diarrhea.  She has not established care yet since her previous PCP retired.  Encouraged her to select a provider and schedule an appointment after returning back from her trip to establish care.  Denies any recent fevers, chills, body aches, nausea, vomiting or diarrhea.  Her last visit to [facility] in [place] was over 30 years ago.  Chief Complaint   Patient presents with     Travel Consult     [facility]         Patient Active Problem List   Diagnosis     Fatigue     Glossodynia     Allergic Rhinitis     Iron Deficiency Anemia Secondary To Inadequate Dietary  Iron Intake     Carpal Tunnel Syndrome     Ganglion     Menorrhagia     Ovarian Cyst     Vitamin D deficiency       Current Outpatient Prescriptions   Medication Sig Dispense Refill     albuterol (PROVENTIL HFA;VENTOLIN HFA) 90 mcg/actuation inhaler Inhale 2 puffs every 6 (six) hours as needed for wheezing. 18 g 1     cholecalciferol, vitamin D3, (VITAMIN D3) 2,000 unit cap Take by mouth.       ferrous sulfate (IRON, FERROUS SULFATE,) 325 (65 FE) MG tablet Take 1 tablet by mouth daily with breakfast.       fluticasone (FLONASE) 50 mcg/actuation nasal spray 2 sprays into each nostril daily. 16 g 0     loratadine (CLARITIN) 10 mg tablet Take 10 mg by mouth daily.       No current facility-administered medications for this visit.        History   Smoking Status     Never Smoker   Smokeless Tobacco     Never Used       REVIEW OF SYSTEMS: Denies abdominal pain, changes in bowel habits, diarrhea, constipation, nausea, vomiting, rectal bleeding, melena, new/unusual/ uncooked foods, no sick contacts or recent travel.       TOBACCO USE:  History   Smoking Status     Never Smoker   Smokeless Tobacco     Never Used     Social History     Social History     Marital status:      Spouse name: Ignacio Mckeon     Number of children: 4     Years of education: N/A     Occupational History     Realtor      Social History Main Topics     Smoking status: Never Smoker     Smokeless tobacco: Never Used     Alcohol use No     Drug use: No     Sexual activity: Yes     Partners: Male     Birth control/ protection: Surgical      Comment: Tubal ligation     Other Topics Concern     Not on file     Social History Narrative    Immigrant first-generation. Originally from [place].   with 4 children - daughter Janice 5/15/00, son Marley 5/8/02, daughter Adam 12/21/06, and daughter Carrie 11/20/09.   and children's last name is Mike.         OBJECTIVE:            Vitals:    10/24/17 1312   BP: 110/72   Pulse: 83   Resp: 14    Temp: 98.6  F (37  C)   SpO2: 97%     Weight: 174 lb (78.9 kg)  Wt Readings from Last 3 Encounters:   10/24/17 174 lb (78.9 kg)   12/29/16 175 lb 1.6 oz (79.4 kg)   11/29/16 175 lb (79.4 kg)     Body mass index is 31.07 kg/(m^2).        Physical Exam:  GENERAL APPEARANCE: A&A, NAD, well hydrated, well nourished  SKIN:  Normal skin turgor, no lesions/rashes   NECK: Supple, without lymphadenopathy, no thyroid mass, no JVD, no bruit  CV: Faint, inspiratory wheezes in all lung fields, no M/G/R   LUNGS: CTAB, normal respiratory effort  ABDOMEN: S&NT, no masses, no organomegaly, BS present x4   EXTREMITY: no edema

## 2021-06-14 NOTE — PROGRESS NOTES
Assessment/Plan:     1. Acute bronchospasm  2. Wheezing  Start prednisone, 40 mg once daily ×5 days.  No fever or coarse lung sounds, which gives me a little concerned for bacterial etiology.  Antibiotics not indicated at this time.  Acid reflux could be playing a part, and I agree with restarting her Prilosec daily.  She will continue her albuterol as needed for cough and wheezing.  May consider a daily steroid inhaler if symptoms become more recurrent or are not improving.  - predniSONE (DELTASONE) 20 MG tablet; Take 2 tablets (40 mg total) by mouth daily for 5 days.  Dispense: 10 tablet; Refill: 0        Subjective:     Renetta Joyner is a 47 y.o. female who presents with a dry cough ×3 weeks.  Associated symptoms include wheezing and itchy throat.  Patient denies fever, sinus congestion, runny nose, sore throat, or ear pain.  Denies any chest pain or shortness of breath.  Patient had similar symptoms about 1 year ago.  She was eventually referred to ENT due to a chronic cough.  She was found to have acid reflux and was also found to have a cat allergy on allergy testing.  Patient stopped taking Prilosec this summer, as her symptoms completely resolved.  She has since restarted the Prilosec ×1 week.  Of note, patient has had a cat in her home for about 2 years.  No history of asthma or smoking.  Is utilizing albuterol at home, which is helpful.  She has also tried Mucinex and Robitussin cough syrup.  Patient is otherwise feeling quite well, and offers no other complaints.      The following portions of the patient's history were reviewed and updated as appropriate: allergies, current medications, past family history, past medical history, past social history, past surgical history and problem list.    Review of Systems  Pertinent items are noted in HPI.     Objective:     /80 (Patient Site: Right Arm, Patient Position: Sitting, Cuff Size: Adult Regular)  Pulse 88  Temp 98.8  F (37.1  C) (Oral)   Ht 5'  "3\" (1.6 m)  Wt 174 lb 9.6 oz (79.2 kg)  LMP 11/10/2017 (Approximate)  SpO2 95%  Breastfeeding? No  BMI 30.93 kg/m2    General Appearance: Alert, cooperative, no distress, appears stated age  Eyes: PERRL, conjunctiva/corneas clear  Ears: Normal TM's and external ear canals, both ears  Throat: Normal pharynx, teeth, and mucosa.   Neck: Supple, symmetrical, trachea midline, no adenopathy  Lungs: Respirations unlabored. Clear to auscultation bilaterally with occasional mild inspiratory/expiratory wheeze. No rhonchi.    Heart: Regular rate and rhythm, S1 and S2 normal, no murmur, rub, or gallop       Diana Echeverria, NP-C      "

## 2021-06-15 NOTE — PROGRESS NOTES
"  Assessment & Plan     Acute midline low back pain without sciatica  -Xray appears normal to me today with the exception of stool in the abdomen. Will start with increasing fiber intake to see if having more regular bowel movements helps and if not she will contact me and I can proceed with CT abdomen and pelvis to evaluate for any other obvious intraabdominal pathologies.   - XR Lumbar Spine 2 or 3 VWS  - Pregnancy, Urine      34 minutes spent on the date of the encounter doing chart review, history and exam, documentation and further activities as noted above         BMI:   Estimated body mass index is 30.36 kg/m  as calculated from the following:    Height as of 8/26/20: 5' 3\" (1.6 m).    Weight as of this encounter: 171 lb 6.4 oz (77.7 kg).   The following high BMI interventions were performed this visit: weight monitoring      Return in about 6 months (around 8/8/2021) for Annual physical.    Nancy Mata MD  [facility]     Renetta Joyner is 50 y.o. and presents to clinic today for the following health issues   HPI   She is here today for low back issues. She does have pain in her lower back, is worse at night especially with rolling over at night. She does have increased pain if she is laying down straight. She does note that seh hasn't had her period since 12/6/20. It stays in the her back. It is more like a cramp initially. She is ok up walking around. She has had no injury. She has had no weakness. She is having normal output of bowel and bladder.     Review of Systems  Per above      Objective    /72 (Patient Site: Left Arm, Patient Position: Sitting, Cuff Size: Adult Regular)   Pulse 76   Wt 171 lb 6.4 oz (77.7 kg)   LMP 12/06/2020 (Exact Date)   SpO2 99%   Breastfeeding No   BMI 30.36 kg/m    Body mass index is 30.36 kg/m .  Physical Exam     GENERAL APPEARANCE: A&A, NAD, well hydrated, well nourished  SKIN:  Normal skin turgor, no " lesions/rashes   NECK: No LAD  CV: RRR, no M/G/R   LUNGS: CTAB  ABDOMEN: S&NT, no masses, no guarding or masses  Back: no tenderness, normal range of motion, no SI joint tenderness, negative straight leg raise  EXTREMITY: no edema   NEURO: no gross deficits, normal reflexes in bilateral lower extremities, normal sensation to light touch in bilateral lower extremities   PSYCH: normal affect

## 2021-06-16 PROBLEM — M79.671 FOOT PAIN, RIGHT: Status: ACTIVE | Noted: 2018-02-20

## 2021-06-16 NOTE — PROGRESS NOTES
Renetta Joyner is a 47 y.o. female is here for a  Health Maintenance exam.  Medical, family and surgical history reviewed with patient.  Patient's are living, positive for hypertension in her mother.  She has 3 brothers and one sister who are all living and well.  She is  to her  Joaquina and they have 4 children, 3 girls and one boy.  She works full-time as a  and as a  part-time.  She does not drink alcohol, smoke cigarettes and denies any illicit drug use.  Medication list reconciled with patient.  She is due for lab work today.  She does report some right neck strain due to sleeping in an odd position.  Encouraged her to apply ice packs to the site, stretch as tolerated and follow-up if neck pain persists or become severe in the next several weeks.  She also reports some right foot pain towards the plantar surface that started 3 weeks ago.  She states the pain becomes worse when she wear shoes with heels on them.  Suspect that she may have a case of a Good's neuroma and encouraged to to purchase a shoe insert and cutting area out of the insert that is directly above the painful area.  She will follow-up in several weeks if this has not been effective in reducing her right foot pain.  Will refer to podiatry at that time for further evaluation and management ifno improvement.  She continues to menstruate every 28 days, she takes oral contraception, her periods are moderate in flow and will typically last 8 days.  She has no concerns for STD testing and denies any abnormal bleeding or vaginal discharge.  Previous Pap smear results reviewed from 2011 and 2014, both with negative cells and no HPV presence.  Next Pap due in 2019.  She is overdue for tetanus vaccination today, orders placed.  Vital signs are stable.    1. Annual physical exam  Comprehensive Metabolic Panel    Lipid Cascade RANDOM    Thyroid Stimulating Hormone (TSH)    Glycosylated Hemoglobin A1c    2. Iron deficiency anemia secondary to inadequate dietary iron intake  HM2(CBC w/o Differential)    Ferritin    Iron   3. Vitamin D deficiency  Vitamin D, Total (25-Hydroxy)   4. Foot pain, right     5. Visit for screening mammogram  Mammo Screening Bilateral   6. Need for vaccination  Tdap vaccine,  6yo or older,  IM     Med list reconciled      Healthy Habits:   Regular Exercise: No, discussed  Sunscreen Use: Yes  Healthy Diet: Yes  Dental Visits Regularly: Yes  Seat Belt: Yes  Sexually active: Yes  Self Breast Exam Monthly:Yes  Hemoccults: No  Flex Sig: No  Colonoscopy: No  Lipid Profile: Yes  Glucose Screen: Yes  Prevention of Osteoporosis: No  Last Dexa: No  Guns at Home:  No  Domestic Violence:  No    Current Outpatient Medications Include:    Current Outpatient Prescriptions:      albuterol (PROVENTIL HFA;VENTOLIN HFA) 90 mcg/actuation inhaler, Inhale 2 puffs every 6 (six) hours as needed for wheezing., Disp: 18 g, Rfl: 1     cholecalciferol, vitamin D3, (VITAMIN D3) 2,000 unit cap, Take by mouth., Disp: , Rfl:      fluticasone (FLONASE) 50 mcg/actuation nasal spray, 2 sprays into each nostril daily., Disp: 16 g, Rfl: 0     loratadine (CLARITIN) 10 mg tablet, Take 10 mg by mouth daily., Disp: , Rfl:      ferrous sulfate (IRON, FERROUS SULFATE,) 325 (65 FE) MG tablet, Take 1 tablet by mouth daily with breakfast., Disp: , Rfl:      omeprazole (PRILOSEC) 20 MG capsule, TK ONE C PO  BID AS DIRECTED TAKE 30 MINUTES BEFORE BREAKFAST AND DINNER ON AN EMPTY STOMACH, Disp: , Rfl: 2     TRINESSA, 28, 0.18/0.215/0.25 mg-35 mcg (28) Tab tablet, , Disp: , Rfl:     Allergies:    Allergies   Allergen Reactions     Cat Dander Cough       Past Medical History:   Diagnosis Date     Abdominal pain, left lower quadrant      Cough      History of pregnancy      - One  5/15/00, then 3 C-sections     Impetigo      Iron deficiency anemia secondary to inadequate dietary iron intake      Nonspecific abnormal results of  thyroid function study      Other disorder of menstruation and other abnormal bleeding from female genital tract      Unspecified vitamin D deficiency     resolved       Past Surgical History:   Procedure Laterality Date      SECTION  2002    Son Marley      SECTION  2006    Daughter Adam      SECTION  2009    Daughter Carrie.      TUBAL LIGATION  2009    With last        OB History    Para Term  AB Living   4 4 4 0 0 4   SAB TAB Ectopic Multiple Live Births   0 0 0 0       # Outcome Date GA Lbr Brad/2nd Weight Sex Delivery Anes PTL Lv   4 Term            3 Term            2 Term            1 Term                   Immunization History   Administered Date(s) Administered     DT (pediatric) 1998     Hep A, Adult IM (19yr & older) 10/24/2017     Hep A, historic 2011, 2011     Hep B, Adult 10/24/2017     Influenza, inj, historic,unspecified 10/08/2017     Influenza, seasonal,quad inj 6-35 mos 2012, 2013     Influenza,seasonal quad, PF, 36+MOS 2015     Meningococcal MCV4P 10/24/2017     Td,adult,historic,unspecified 10/18/2007     Tdap 10/18/2007       Family History   Problem Relation Age of Onset     Asthma Father      Breast cancer Maternal Aunt 52     Breast cancer Maternal Aunt 67     Breast cancer Cousin 40     Pernicious anemia Brother      2 brothers with B12 deficiency, not known if pernicious anemia.       Social History     Social History     Marital status:      Spouse name: Ignacio Mckeon     Number of children: 4     Years of education: N/A     Occupational History     Realtor      Social History Main Topics     Smoking status: Never Smoker     Smokeless tobacco: Never Used     Alcohol use No     Drug use: No     Sexual activity: Yes     Partners: Male     Birth control/ protection: Surgical      Comment: Tubal ligation     Other Topics Concern     Not on file     Social History Narrative     "Immigrant first-generation. Originally from [place].   with 4 children - daughter Janice 5/15/00, son Marley 5/8/02, daughter Adam 12/21/06, and daughter Carrie 11/20/09.   and children's last name is Mike.       Last cholesterol:   Lab Results   Component Value Date    CHOL 137 06/06/2016    CHOL 152 04/27/2015    CHOL 145 01/09/2014     Lab Results   Component Value Date    HDL 46 (L) 06/06/2016    HDL 47 04/27/2015    HDL 52 01/09/2014     Lab Results   Component Value Date    LDLCALC 74 06/06/2016    LDLCALC 80 04/27/2015    LDLCALC 79 01/09/2014     Lab Results   Component Value Date    TRIG 84 06/06/2016    TRIG 127 04/27/2015    TRIG 67 01/09/2014     No components found for: CHOLHDL    MAMMO: Overdue, orders placed      Birth Control Method: OCP  High Risk/Behavior: No      LMP: Patient's last menstrual period was 02/01/2018.  Menstrual Regularity: every 30 days  Flow: moderate, lasts 8 days      Review of Systems:   General:  Denies fever, chills, HA, fatigue, myalgias, weight change    Eyes: Denies vision changes   Ears/Nose/Throat: Denies nasal congestion, rhinorrhea, ear pain or discharge, sore throat, swollen glands  Cardiovascular: Denies CP, palpitations  Respiratory:  Denies SOB, cough  Gastrointestinal:  Denies changes in bowel habits, melena, rectal bleeding,  Genitourinary: Denies changes in urine habits/frequency/dysuria, hematuria   Musculoskeletal:  Denies swelling or erythema, edema  Skin: Denies rashes   Neurologic: Denies weakness, paresthesia  Psychiatric: Denies mood changes   Endocrine: Denies polyuria, polydipsia, polyphagia  Heme/Lymphatic: Denies problem with bleeding   Allergic/Immunologic: Denies problem     POSITIVES: right neck strain, right foot pain      PHYSICAL EXAM:    /66  Pulse 88  Temp 98.1  F (36.7  C) (Oral)   Resp 14  Ht 5' 3\" (1.6 m)  Wt 175 lb (79.4 kg)  LMP 02/01/2018  SpO2 98%  Breastfeeding? No  BMI 31 kg/m2    Wt Readings from Last 3 " Encounters:   02/20/18 175 lb (79.4 kg)   11/14/17 174 lb 9.6 oz (79.2 kg)   10/24/17 174 lb (78.9 kg)       Body mass index is 31 kg/(m^2).    Well developed, well nourished, no acute distress.  HEENT: normocephalic/atraumatic,   EYES:PERRLA/EOMI   EARS: TMs: Gray, normal light reflex   NOSE:  no nasal discharge.    MOUTH: Oral mucosa: no erythema/exudate  Neck: No LAD/masses/thyromegaly/bruits  Lungs: clear bilaterally  Heart: regular rate and rhythm, no murmurs/gallops/rubs, no edema, bilateral femoral, pedal and posttibial pulses intact, 2+  Breasts: symmetric, no masses/skin changes, nipple discharge, or axillary LAD.  BSE reviewed.  Abdomen: Normal bowel sounds, soft, non-tender, non-distended, no masses, neg Huizar's/McBurney's, no rebound/guarding  Genital: Normal external genitalia, no discharge, no lesions.  Uterus is not enlarged, perineum intact. Pap not due, manual exam reveals no masses or abnormal bleeding.  Rectal: internal exam is deferred.  External exam is normal.  Lymphatics: no supraclavicular/axillary/epitrochlear/inguinal LAD. No edema.  Neuro: A&O x 3, CN II-XII intact, strength 5/5, reflexes symmetric, sensory intact to light touch.  Psych: Behavior appropriate, engaging.  Thought processes congruent, non-tangential.  Musculoskeletal: no gross deformities. Right pad of foot painful with palpation, no redness or swelling  Skin: no rashes or lesions.      No results found for this or any previous visit (from the past 240 hour(s)).    ASSESSMENT/PLAN: 47 y.o. female physical exam and pelvic exam      The following high BMI interventions were performed this visit: encouragement to exercise and weight monitoring    1. Annual physical exam    - Comprehensive Metabolic Panel  - Lipid Cascade RANDOM  - Thyroid Stimulating Hormone (TSH)  - Glycosylated Hemoglobin A1c    2. Iron deficiency anemia secondary to inadequate dietary iron intake    - HM2(CBC w/o Differential)  - Ferritin  - Iron    3.  Vitamin D deficiency    - Vitamin D, Total (25-Hydroxy)    4. Foot pain, right    -suspect Good Neuroma, patient instructed to place insert onto pad of foot, cut out area on pad directly above area that is painful    5. Visit for screening mammogram    - Mammo Screening Bilateral; Future    6. Need for vaccination    - Tdap vaccine,  8yo or older,  IM      There are no discontinued medications.    Routine health maintenance discussion:  No smoking, limited alcohol (7 or less servings per week), 5 fruits/veg servings per day, 200 minutes of exercise per week.  Daily calcium/vitamin D guidelines, bone health, yearly mammogram after age 39/regular pap smears/colon cancer screening beginning at age 50.  Accident avoidance, sun screen.      Will contact her with the results of the labs when available.    Return to clinic if right foot pain persists or become severe in the next several weeks.  Will refer to podiatry at that time if shoe inserts have not been helpful.    Kaylee Amado , CNP

## 2021-06-17 NOTE — TELEPHONE ENCOUNTER
Telephone Encounter by Angeles Henao LPN at 5/1/2020  4:06 PM     Author: Angeles Henao LPN Service: -- Author Type: Licensed Nurse    Filed: 5/1/2020  4:07 PM Encounter Date: 4/29/2020 Status: Signed    : Angeles Henao LPN (Licensed Nurse)       Patient Returning Call  Reason for call:  Patient returning call   Information relayed to patient:  Nancy Mata MD   to Nancy Mata Care Team [place]         8:22 AM   Note      Attempted to call, no answer, left voice mail that we would call later today:      Her labs were all normal showing no issues with celiac disease. Her hemoglobin and iron were back to normal with the iron that she is taking as well. This is all good news.      I'd encourage her to get scheduled with dermatology to talk about the skin issues more.       Patient has additional questions:  No  If YES, what are your questions/concerns:  Patient requested to mail her lab results to her home address .   Okay to leave a detailed message?: No

## 2021-06-19 NOTE — LETTER
Letter by Nancy Mata MD at      Author: Nancy Mata MD Service: -- Author Type: --    Filed:  Encounter Date: 4/11/2019 Status: (Other)         Renetta Joyner  [address] [place] [address]         April 11, 2019         Dear Ms. Joyner,    Below are the results from your recent visit:    Resulted Orders   Glycosylated Hemoglobin A1c   Result Value Ref Range    Hemoglobin A1c 5.5 3.5 - 6.0 %   HM2(CBC w/o Differential)   Result Value Ref Range    WBC 4.2 4.0 - 11.0 thou/uL    RBC 4.23 3.80 - 5.40 mill/uL    Hemoglobin 13.1 12.0 - 16.0 g/dL    Hematocrit 39.5 35.0 - 47.0 %    MCV 93 80 - 100 fL    MCH 31.0 27.0 - 34.0 pg    MCHC 33.3 32.0 - 36.0 g/dL    RDW 11.3 11.0 - 14.5 %    Platelets 227 140 - 440 thou/uL    MPV 8.7 7.0 - 10.0 fL   Comprehensive Metabolic Panel   Result Value Ref Range    Sodium 140 136 - 145 mmol/L    Potassium 4.0 3.5 - 5.0 mmol/L    Chloride 104 98 - 107 mmol/L    CO2 26 22 - 31 mmol/L    Anion Gap, Calculation 10 5 - 18 mmol/L    Glucose 102 70 - 125 mg/dL    BUN 16 8 - 22 mg/dL    Creatinine 0.76 0.60 - 1.10 mg/dL    GFR MDRD Af Amer >60 >60 mL/min/1.73m2    GFR MDRD Non Af Amer >60 >60 mL/min/1.73m2    Bilirubin, Total 0.3 0.0 - 1.0 mg/dL    Calcium 9.1 8.5 - 10.5 mg/dL    Protein, Total 7.2 6.0 - 8.0 g/dL    Albumin 3.7 3.5 - 5.0 g/dL    Alkaline Phosphatase 106 45 - 120 U/L    AST 16 0 - 40 U/L    ALT 17 0 - 45 U/L    Narrative    Fasting Glucose reference range is 70-99 mg/dL per  American Diabetes Association (ADA) guidelines.   Ferritin   Result Value Ref Range    Ferritin 8 (L) 10 - 130 ng/mL   Iron and Transferrin Iron Binding Capacity   Result Value Ref Range    Iron 51 42 - 175 ug/dL    Transferrin 280 212 - 360 mg/dL    Transferrin Saturation, Calculated 15 (L) 20 - 50 %    Transferrin IBC, Calculated 350 313 - 563 ug/dL   Vitamin D, Total (25-Hydroxy)   Result Value Ref Range    Vitamin D, Total (25-Hydroxy) 36.5 30.0 - 80.0  ng/mL    Narrative    Deficiency <10.0 ng/mL  Insufficiency 10.0-29.9 ng/mL  Sufficiency 30.0-80.0 ng/mL  Toxicity (possible) >100.0 ng/mL   Vitamin B12   Result Value Ref Range    Vitamin B-12 149 (L) 213 - 816 pg/mL   T4, Free   Result Value Ref Range    Free T4 1.0 0.7 - 1.8 ng/dL   Lipid Cascade   Result Value Ref Range    Cholesterol 159 <=199 mg/dL    Triglycerides 85 <=149 mg/dL    HDL Cholesterol 52 >=50 mg/dL    LDL Calculated 90 <=129 mg/dL    Patient Fasting > 8hrs? Yes    Thyroid Stimulating Hormone (TSH)   Result Value Ref Range    TSH 3.00 0.30 - 5.00 uIU/mL       Your B12 is low, I'd recommend a daily, sublingual vitamin and we should recheck this again in 3-6 months. Your vitamin D is normal as is your thyroid though which is great. Your cholesterol is excellent and you do not need treatment for this.     Your average blood sugar is great.     Your iron is low normal, I'd stay on the iron pills for now unfortunately.         Please call with questions or contact us using [employer].    Sincerely,      Electronically signed by Nancy Mata MD

## 2021-06-20 NOTE — LETTER
Letter by Tori Hoskins MD at      Author: Tori Hoskins MD Service: -- Author Type: --    Filed:  Encounter Date: 1/30/2020 Status: (Other)         Renetta Joyner  [address] [facility] [address]             January 30, 2020         Dear Ms. Joyner,    Below are the results from your recent visit:    Resulted Orders   HM2(CBC w/o Differential)   Result Value Ref Range    WBC 6.9 4.0 - 11.0 thou/uL    RBC 4.25 3.80 - 5.40 mill/uL    Hemoglobin 10.0 (L) 12.0 - 16.0 g/dL    Hematocrit 31.3 (L) 35.0 - 47.0 %    MCV 74 (L) 80 - 100 fL    MCH 23.5 (L) 27.0 - 34.0 pg    MCHC 31.9 (L) 32.0 - 36.0 g/dL    RDW 12.8 11.0 - 14.5 %    Platelets 277 140 - 440 thou/uL    MPV 7.8 7.0 - 10.0 fL   Ferritin   Result Value Ref Range    Ferritin <2 (L) 10 - 130 ng/mL   Comprehensive Metabolic Panel   Result Value Ref Range    Sodium 138 136 - 145 mmol/L    Potassium 3.8 3.5 - 5.0 mmol/L    Chloride 103 98 - 107 mmol/L    CO2 26 22 - 31 mmol/L    Anion Gap, Calculation 9 5 - 18 mmol/L    Glucose 96 70 - 125 mg/dL    BUN 16 8 - 22 mg/dL    Creatinine 0.72 0.60 - 1.10 mg/dL    GFR MDRD Af Amer >60 >60 mL/min/1.73m2    GFR MDRD Non Af Amer >60 >60 mL/min/1.73m2    Bilirubin, Total 0.3 0.0 - 1.0 mg/dL    Calcium 9.1 8.5 - 10.5 mg/dL    Protein, Total 7.6 6.0 - 8.0 g/dL    Albumin 3.9 3.5 - 5.0 g/dL    Alkaline Phosphatase 119 45 - 120 U/L    AST 15 0 - 40 U/L    ALT 18 0 - 45 U/L    Narrative    Fasting Glucose reference range is 70-99 mg/dL per  American Diabetes Association (ADA) guidelines.   Wet Prep, Vaginal   Result Value Ref Range    Yeast Result No yeast seen No yeast seen    Trichomonas No Trichomonas seen No Trichomonas seen    Clue Cells, Wet Prep No Clue cells seen No Clue cells seen       Iron is very low.  I hope you are taking your iron supplement three times daily.  Liver and kidney results are normal.  Please follow up with your primary care provider in 1 month.    Please  call with questions or contact us using [employer].    Sincerely,        Electronically signed by Tori Hutchinson MD

## 2021-06-21 NOTE — PROGRESS NOTES
[facility] Daily Progress     Patient Name: Renetta Joyner  Date: 2018  Visit #: 2/10  11/20/18-18  Referral Diagnosis: Neck pain  Referring provider: Nancy Mata*  Visit Diagnosis:     ICD-10-CM    1. Neck and shoulder pain M54.2     M25.519    2. Generalized muscle weakness M62.81    3. Poor posture R29.3          Assessment:     Patient is benefitting from skilled physical therapy and is making steady progress toward functional goals.  Patient is appropriate to continue with skilled physical therapy intervention, as indicated by initial plan of care.    Goal Status:  Pt. will be independent with home exercise program in : 6 weeks    Pt will: Able to turn head to left to drive with pain 0-1/10 within 8 weeks  Pt will: Able to look down to read or be on phone for 15+ minutes with pain 0-1/10 within 8 weeks  Pt will: Able to lift objects > 8#, such as a gallon of milk, within 8 weeks  Pt will: Able to roll over in bed without pain within 8 weeks      Plan / Patient Education:       Massage  KT  Strengthening when able    Subjective:     Pain Ratin    I am doing the exercises and they seem to help. I feel better after them.  The massage felt good after last visit.  It lasted 1-2 days      Objective:   Functional limitations are described as occurring with: lifting, looking down, turning right, sleeping    Exercises:  Exercise #1: Stretches: supine thoracic towel roll stretch,  UT and LS  Comment #1: hold 30 seconds X 1-3 reps  Exercise #2: chin tuck and scapular retraction   hold 10 seconds x 10 reps  Comment #2: reverse wall push-up   hold 10 seconds X 6-12 reps  Exercise #3: Wall mario  X 20      Treatment Today     TREATMENT MINUTES COMMENTS   Evaluation     Self-care/ Home management     Manual therapy 15 Pt sitting in chair, massage to right UT, LS, rhomboid, middle trap      Neuromuscular Re-education     Therapeutic Activity     Therapeutic Exercises 15 See above or flow  sheet; reviewed ex and added reverse wall push-up and wall mario   Gait training     Modality__________________                Total 30    Blank areas are intentional and mean the treatment did not include these items.       Brenda Skinner, PT  11/26/2018

## 2021-06-21 NOTE — PROGRESS NOTES
[facility]   Cervical Initial Evaluation    Patient Name: Renetta Joyner  Date of evaluation: 11/20/2018  Referral Diagnosis: Neck pain  Referring provider: Nancy Mata*  Visit Diagnosis:     ICD-10-CM    1. Neck and shoulder pain M54.2     M25.519    2. Generalized muscle weakness M62.81    3. Poor posture R29.3        Assessment:      Pt. is appropriate for skilled PT intervention as outlined in the Plan of Care (POC).  Pt. is a good candidate for skilled PT services to improve pain levels and function.    Patient presents to clinic with chronic right sided neck and scapular pain.  She woke one day and had pain and thought it was her pillow.  She has changed her pillow but the pain remains.  She feels pain with neck AROM, decrease strength and poor posture.  Functional limitations are described as occurring with: lifting, looking down, turning right, sleeping.  Feel patient will benefit from physical therapy to decrease pain levels and improve function.    Goals:  Pt. will be independent with home exercise program in : 6 weeks    Pt will: Able to turn head to left to drive with pain 0-1/10 within 8 weeks  Pt will: Able to look down to read or be on phone for 15+ minutes with pain 0-1/10 within 8 weeks  Pt will: Able to lift objects > 8#, such as a gallon of milk, within 8 weeks  Pt will: Able to roll over in bed without pain within 8 weeks      Patient's expectations/goals are realistic.    Barriers to Learning or Achieving Goals:  Co-morbidities or other medical factors.  overweight, HX CTS      POC, goals and pathology of condition were reviewed with the patient.  Patient is in agreement with the POC.       Plan / Patient Instructions:        Plan of Care:   Authorization / Certification Start Date: 11/20/18  Authorization / Certification End Date: 01/19/19  Authorization / Certification Number of Visits: 10  Communication with: Referral Source  Patient Related Instruction: Nature of  Condition;Treatment plan and rationale;Self Care instruction;Basis of treatment  Times per Week: 1-2  Number of Weeks: 8  Number of Visits: 10  Therapeutic Exercise: ROM;Stretching;Strengthening  Neuromuscular Reeducation: kinesio tape;posture;core  Manual Therapy: soft tissue mobilization;myofascial release;muscle energy;joint mobilization;strain counterstrain  Modalities: ultrasound      Plan for next visit:   Review stretches  Massage  KT  Strengthening when able     Subjective:           History of Present Illness:    Renetta is a 48 y.o. female who presents to therapy today with complaints of cervical pain. Date of onset/duration of symptoms is April 2018. Onset was gradual as patient woke with the pain.  She thought it was her pillow.  As time has progressed it continues to be painful.  She has tried heat which has helped.  The pain is on the right side of the neck and denies UE involvement. She does not have any headaches with this.She had an X-ray on 10/22/18:  FINDINGS: Lateral cervical alignment satisfactory. Normal vertebral body heights. Slight narrowing of the C5-6 intervertebral disc with some ventral spurring. Mild facet degeneration lower cervical facets. Normal odontoid view. Normal prevertebral tissues. Lung apices clear. Symptoms are intermittent and not improving. She feels the pain increases as the day progresses.  She has had right shoulder problems in the past and feels her shoulder is a little sore as well.  This started when her son was born     Pain Rating:3  Pain rating at best: 0  Pain rating at worst: 8  Pain description: soreness and stiffness    Functional limitations are described as occurring with:   lifting, looking down, turning right, sleeping    Patient reports benefit from:  heating pad         Objective:      Note: Items left blank indicates the item was not performed or not indicated at the time of the evaluation.    Patient Outcome Measures :    Neck Disability Score in %: 12      Scores range from 0-100%, where a score of 0% represents minimal pain and maximal function. The minmal clinically important difference is a score reduction of 10%.    Examination  1. Neck and shoulder pain     2. Generalized muscle weakness     3. Poor posture       Involved side: Right  Posture Observation:      Cervical:  Mild forward head  Shoulder/Thoracic complex: Moderately increased CT junction thoracic kyphosis      Cervical ROM    Within Normal Limits unless noted  Date: 11/20/18     *Indicate scale AROM AROM AROM   Cervical Flexion      Cervical Extension Pull at end range      Right Left Right Left Right Left   Cervical Sidebending P on right P on right       Cervical Rotation  P on right       Cervical Protraction      Cervical Retraction      Thoracic Flexion      Thoracic Extension      Thoracic Sidebending         Thoracic Rotation               Strength  Date: 11/20/18     Cervical Myotomes/5 Right Left Right Left Right Left   Cervical Flexion (C1-2) 5        Cervical Sidebending (C3) 5 5       Shoulder Elevation (C4) 5 5       Shoulder Abduction (C5) 4+ 4+       Elbow Flexion (C6) 5 5       Elbow Extension (C7) 5 5       Wrist Flexion (C7)         Wrist Extension (C6)         Thumb abduction (C8)         Finger Abduction (T1)             Sensation      Reflex Testing  Cervical Dermatomes Right Left UE Reflexes Right Left   Back of the Head (C2)   Biceps (C5-6)     Supraclavicular Fossa (C3)   Brachioradialis (C5-6)     AC Joint (C4)   Triceps (C7-8)     Lateral Biceps (C5)   Morris s test     Palmar Thumb (C6)   LE Reflexes     Palmar 3rd Finger (C7)   Patellar (L3-4)     Palmar 5th Finger (C8)   Achilles (S1-2)     Ulnar Forearm (T1)   Babinski Response         Cervical Special Tests  Cervical Special Tests Right Left UE Nerve Mobility Right Left   Cervical compression - - Median nerve     Cervical distraction - - Ulnar nerve     Spurling s test   Radial nerve     Shoulder abduction sign    Thoracic outlet     Deep neck flexor endurance test   Trey     Upper cervical rotation   Adson s     Sharper-Gayla   Cervical rotation lateral flexion     Alar ligament test   Other:     Other:   Other:       Exercises:  Exercise #1: Stretches: supine thoracic towel roll stretch,  UT and LS  Comment #1: hold 30 seconds X 1-3 reps  Exercise #2: chin tuck and scapular retraction   hold 10 seconds x 10 reps    Treatment Today     TREATMENT MINUTES COMMENTS   Evaluation 25 Cervical    Self-care/ Home management     Manual therapy 15 Pt sitting in chair, massage to right UT, LS, rhomboid, middle trap   Neuromuscular Re-education     Therapeutic Activity     Therapeutic Exercises 15 Edu on DX, POC, see above or flow sheet, brief edu on posture   Gait training     Modality__________________                Total 55    Blank areas are intentional and mean the treatment did not include these items.     PT Evaluation Code: (Please list factors)  Patient History/Comorbidities: HX CTS, overweight  Examination: pain with neck motion, slight decrease in strength, decrease functional activities-see above or flow sheet  Clinical Presentation: stable  Clinical Decision Making: low    Patient History/  Comorbidities Examination  (body structures and functions, activity limitations, and/or participation restrictions) Clinical Presentation Clinical Decision Making (Complexity)   No documented Comorbidities or personal factors 1-2 Elements Stable and/or uncomplicated Low   1-2 documented comorbidities or personal factor 3 Elements Evolving clinical presentation with changing characteristics Moderate   3-4 documented comorbidities or personal factors 4 or more Unstable and unpredictable High              Brenda Skinner, PT  11/20/2018  8:05 AM

## 2021-06-21 NOTE — PROGRESS NOTES
Chief Complaint   Patient presents with     Toe Pain       HPI: Patient presents with pain at the base of her right foots great toe on the medial side following an episode yesterday where she was kicking a soccer ball with 1 of her children.  She notes that the area seems a little more swollen and bruised and has decreased range of motion in it.  She does note a little bit of discomfort with walking.  She has taken 1 tablet of ibuprofen for pain relief.  She denies numbness and tingling.  No other bone fracture history.  No breaks in the skin.    ROS:Review of Systems - History obtained from the patient  General ROS: negative  Musculoskeletal ROS: negative  Neurological ROS: negative  Dermatological ROS: negative    SH: The Patient's  reports that she has never smoked. She has never used smokeless tobacco. She reports that she does not drink alcohol or use illicit drugs.      FH: The Patient's family history includes Asthma in her father; Breast cancer (age of onset: 40) in her cousin; Breast cancer (age of onset: 52) in her maternal aunt; Breast cancer (age of onset: 67) in her maternal aunt; Pernicious anemia in her brother.     Meds:    Current Outpatient Prescriptions on File Prior to Visit   Medication Sig Dispense Refill     albuterol (PROVENTIL HFA;VENTOLIN HFA) 90 mcg/actuation inhaler Inhale 2 puffs every 6 (six) hours as needed for wheezing. 18 g 1     cholecalciferol, vitamin D3, (VITAMIN D3) 2,000 unit cap Take by mouth.       ferrous sulfate (IRON, FERROUS SULFATE,) 325 (65 FE) MG tablet Take 1 tablet by mouth daily with breakfast.       fluticasone (FLONASE) 50 mcg/actuation nasal spray 2 sprays into each nostril daily. 16 g 0     loratadine (CLARITIN) 10 mg tablet Take 10 mg by mouth daily.       omeprazole (PRILOSEC) 20 MG capsule TK ONE C PO  BID AS DIRECTED TAKE 30 MINUTES BEFORE BREAKFAST AND DINNER ON AN EMPTY STOMACH  2     TRINESSA, 28, 0.18/0.215/0.25 mg-35 mcg (28) Tab tablet        No  "current facility-administered medications on file prior to visit.        O:  /78  Pulse 80  Ht 5' 3\" (1.6 m)  Wt 169 lb 6 oz (76.8 kg)  Breastfeeding? Unknown  BMI 30 kg/m2    Physical Examination:   General appearance - alert, well appearing, and in no distress  Mental status - alert, oriented to person, place, and time  Neurological - alert, oriented, normal speech, no focal findings or movement disorder noted, neck supple without rigidity,  motor and sensory grossly normal bilaterally, normal muscle tone, no tremors, strength 5/5  Musculoskeletal -there is mild swelling over the dorsal aspect of the medial side of the right foot.  No crepitus felt.  Slight decreased range of motion in the great toe secondary to discomfort.  Mild bruising noted.  Extremities - peripheral pulses normal, no pedal edema, no clubbing or cyanosis  Skin - normal coloration and turgor, no rashes, no suspicious skin lesions noted      A/P:     Problem List Items Addressed This Visit     None      Visit Diagnoses     Foot injury, right, initial encounter    -  Primary    Relevant Orders    XR Foot Right 3 or More VWS (Completed)            1. Foot injury, right, initial encounter  Per radiology, soft tissue swelling but no acute fracture or dislocation.  The patient was given instructions about over-the-counter NSAID use, ice, rest, and elevation.  If symptoms fail to improve as anticipated, she will let us know.  - XR Foot Right 3 or More VWS; Future        Connor Myers, CNP    "

## 2021-06-21 NOTE — PROGRESS NOTES
ASSESSMENT/PLAN:       1. Cough  -Given the duration of her symptoms will send in azithromycin and an inhaler. She will let me know if not improving.   - azithromycin (ZITHROMAX) 250 MG tablet; Take 500 mg (2 x 250 mg tablets) on day 1 followed by 250 mg (1 tablet) on days 2-5.  Dispense: 6 tablet; Refill: 0  - albuterol (PROAIR HFA;PROVENTIL HFA;VENTOLIN HFA) 90 mcg/actuation inhaler; Inhale 2 puffs every 6 (six) hours as needed for wheezing.  Dispense: 18 g; Refill: 1    2. Pelvic pain in female  -Given her pain and history will update a pelvic US. If normal continue to monitor for now.   - US Pelvis With Transvaginal Non OB; Future    3. Neck pain  -Discussed that since the pain has been lasting for the last few months I would recommend PT as the XRay looks relatively normal. If she doesn't start feeling better with that she will let me know and we can proceed with MRI and/or a referral to the spine clinic.   - XR Cervical Spine 2 - 3 VWS; Future  - Ambulatory referral to PT/OT    4. Cervical cancer screening  -Updated today as we were doing a pelvic exam anyway.   - Gynecologic Cytology (PAP Smear)  - HPV High Risk DNA Cervical        Nancy Mata MD      PROGRESS NOTE   10/22/2018    SUBJECTIVE:  Renetta Joyner is a 48 y.o. female  who presents for several issues.     She has had a cough in the last two weeks. It is productive at times. She does have wheezing at times as well. She has had a sore throat as well. She has been using an inhaler and that does help. OTC cough medicines don't help. No fevers. No pain in her face or ear. She has had no nausea, diarrhea.     She does have pain now in the 8 months in her right neck.She has issues turning to the left. The pain will radiate down the trapezius muscle on that side. She did try a heating pad, medicine, buddy pringle. The heating pad does help at times but not all the time. She did have not have nay injury. Her arms feel strong.     She does also have  pelvic pain. She has had two months of intermittent pain in the pelvis. It is minor, like you would have with a period in ehr loewr abdomen. Her periods are regular. She does not have abnormal bleeding, but it does go 7-8 days. She ddi have a cyst before seen on ultrasound and was followed up at OB. We do not have those records but she was told things looked ok and she could defer further follow up.   Chief Complaint   Patient presents with     Cough     Patient has been having a dry to productive cough for two weeks. Other symptoms include wheezing and nasal congestion. No known fevers.      Muscle Pain     Patient has been having muslce pain in her right shoulder into the right side of her neck for 8 months. No known injury.      Pelvic Pain     Patient has been having off and on pelvic pain, almost feels like menstrual related pain. No abnormal bleeding or discharge.          Patient Active Problem List   Diagnosis     Fatigue     Glossodynia     Allergic Rhinitis     Iron Deficiency Anemia Secondary To Inadequate Dietary Iron Intake     Carpal Tunnel Syndrome     Ganglion     Menorrhagia     Ovarian Cyst     Vitamin D deficiency     Annual physical exam     Foot pain, right     Visit for screening mammogram     Need for vaccination       Current Outpatient Prescriptions   Medication Sig Dispense Refill     albuterol (PROAIR HFA;PROVENTIL HFA;VENTOLIN HFA) 90 mcg/actuation inhaler Inhale 2 puffs every 6 (six) hours as needed for wheezing. 18 g 1     cholecalciferol, vitamin D3, (VITAMIN D3) 2,000 unit cap Take by mouth.       ferrous sulfate (IRON, FERROUS SULFATE,) 325 (65 FE) MG tablet Take 1 tablet by mouth daily with breakfast.       fluticasone (FLONASE) 50 mcg/actuation nasal spray 2 sprays into each nostril daily. 16 g 0     loratadine (CLARITIN) 10 mg tablet Take 10 mg by mouth daily.       azithromycin (ZITHROMAX) 250 MG tablet Take 500 mg (2 x 250 mg tablets) on day 1 followed by 250 mg (1 tablet) on  days 2-5. 6 tablet 0     No current facility-administered medications for this visit.        History   Smoking Status     Never Smoker   Smokeless Tobacco     Never Used           OBJECTIVE:        No results found for this or any previous visit (from the past 240 hour(s)).    Vitals:    10/22/18 1411 10/22/18 1423   BP: 148/80 140/74   Patient Site: Right Arm    Patient Position: Sitting    Cuff Size: Adult Regular    Pulse: 80    Temp: 97.9  F (36.6  C)    TempSrc: Oral    SpO2: 98%    Weight: 170 lb 6.4 oz (77.3 kg)      Weight: 170 lb 6.4 oz (77.3 kg)        Physical Exam:  GENERAL APPEARANCE: A&A, NAD, well hydrated, well nourished  SKIN:  Normal skin turgor, no lesions/rashes   HEENT: moist mucous membranes, no rhinorrhea  NECK Some tenderness over the right upper back muscles with palpation. Cervical spine without stepoffs or midline tenderness.   CV: RRR, no M/G/R   LUNGS: CTAB  ABDOMEN: S&NT, no masses  Pelvic exam: Normal external genitalia, cervix is normal appearing without lesions, pap collected today. No cervical motion tenderness, no pelvic fullness or reproducible pain with bimanual exam.   EXTREMITY: no edema   NEURO: no gross deficits, normal reflexes in the biceps and brachioradialis tendons bilaterally

## 2021-06-22 NOTE — PROGRESS NOTES
ASSESSMENT/PLAN:       1. Nonintractable episodic headache, unspecified headache type  -We discussed possible etiologies including sinus infection, nonrestorative sleep, tension related to her known neck issues, and more sinister things such as a mass.  Given the intermittent nature of her headache, the fact that it improves with sleeping, the lack of any migrainous type symptoms and her normal neuro exam for now we will monitor.  She will work on increasing her sleep hygiene to at least 7 hours a night, she will monitor her blood pressure 3 times a week, she will limit her caffeine and she will follow-up here in a month.  If her symptoms are persisting I will at that time proceed with a CT exam of the head, or if they worsen prior to that she will let me know in which case I will get a CT ordered at that time.  Additionally she will let me know if her blood pressure maintains over 150 or over 90 systolically and diastolically respectively which case we can consider starting a very low-dose of hydrochlorothiazide.    2. Neck pain  -Prescription written for massage chair for her to obtain, again she will continue her exercises and physical therapy we discussed if her symptoms are still not improving we can proceed with MRI of the cervical spine.         Return in about 1 month (around 1/11/2019) for recheck.      Nancy Mata MD      PROGRESS NOTE   12/11/2018    SUBJECTIVE:  Renetta Joyner is a 48 y.o. female  who presents for headaches.     She has been having intermittent headaches in the last month. She has them at varying times during the day. She did have a headache this morning when she woke up and did check her blood pressure and it was 150/90. She has the pain mainly over her temples and frontal sinus area.  It seems to go through her temples rather than around the front in a rapid nature.  She does have some stress however at home.  She doesn't check her BP daily. She did try some tylenol and  ibuprofen, no help. There isno change with the PT that she is doing. It is a dull ache type pain.     She has had a stuffy nose, denies throat congestion. She does not have a cough. She has had no fevers, changes in her vision. Does have bifocal glasses, doesn't wear much. SHe is using the flonase. She does sleep ok, does get about 6 hours of sleep a night. She does not have nausea, light or sound sensitivity with the headaches.     She is feeling much better with her neck pain with massage and PT, but the effects only last for a few hours before getting worse again. sheis wondering about a massage chair to have at home and if insurance would cover that.  She does find that the pain returns after a few hours after having massage and doing physical therapy.  Chief Complaint   Patient presents with     Headache     Patient has been having a frontal headache off and on for the past month. Patient did check her blood pressure this morning after waking up with a headache and it was 150/90.          Patient Active Problem List   Diagnosis     Fatigue     Glossodynia     Allergic Rhinitis     Iron Deficiency Anemia Secondary To Inadequate Dietary Iron Intake     Carpal Tunnel Syndrome     Ganglion     Menorrhagia     Ovarian Cyst     Vitamin D deficiency     Annual physical exam     Foot pain, right     Visit for screening mammogram     Need for vaccination       Current Outpatient Medications   Medication Sig Dispense Refill     albuterol (PROAIR HFA;PROVENTIL HFA;VENTOLIN HFA) 90 mcg/actuation inhaler Inhale 2 puffs every 6 (six) hours as needed for wheezing. 18 g 1     cholecalciferol, vitamin D3, (VITAMIN D3) 2,000 unit cap Take by mouth.       ferrous sulfate (IRON, FERROUS SULFATE,) 325 (65 FE) MG tablet Take 1 tablet by mouth daily with breakfast.       fluticasone (FLONASE) 50 mcg/actuation nasal spray 2 sprays into each nostril daily. 16 g 0     loratadine (CLARITIN) 10 mg tablet Take 10 mg by mouth daily.        No current facility-administered medications for this visit.        Social History     Tobacco Use   Smoking Status Never Smoker   Smokeless Tobacco Never Used           OBJECTIVE:        No results found for this or any previous visit (from the past 240 hour(s)).    Vitals:    12/11/18 1355   BP: 124/84   Patient Site: Left Arm   Patient Position: Sitting   Cuff Size: Adult Regular   Pulse: 82   SpO2: 99%   Weight: 170 lb 3.2 oz (77.2 kg)     Weight: 170 lb 3.2 oz (77.2 kg)          Physical Exam:  GENERAL APPEARANCE: A&A, NAD, well hydrated, well nourished  SKIN:  Normal skin turgor, no lesions/rashes   HEENT: moist mucous membranes, no rhinorrhea, sinuses are nontender to palpation, tympanic membrane's are clear bilaterally, pharynx is unremarkable, funduscopic exam to the best my ability is unremarkable appearing  NECK: Normal, without lymphadenopathy  CV: RRR, no M/G/R   LUNGS: CTAB  EXTREMITY: no edema   NEURO: no gross deficits, gait is appropriate, reflexes in the upper extremities are brisk at the brachioradialis and biceps tendons, patellar tendon reflexes are symmetrical and normal  Psych: Her affect is stable, she is casually dressed and groomed, her thought process and speech pattern normal

## 2021-06-22 NOTE — PROGRESS NOTES
[facility] Daily Progress     [facility] Discharge Summary      Patient Name: Renetta Joyner  Date: 1/21/2019  Referral Diagnosis: Neck Pain  Referring provider: Nancy Mata*  Visit Diagnosis:   1. Neck and shoulder pain     2. Generalized muscle weakness     3. Poor posture         Goals:  Pt. will be independent with home exercise program in : 6 weeks    Pt will: Able to turn head to left to drive with pain 0-1/10 within 8 weeks  Pt will: Able to look down to read or be on phone for 15+ minutes with pain 0-1/10 within 8 weeks  Pt will: Able to lift objects > 8#, such as a gallon of milk, within 8 weeks  Pt will: Able to roll over in bed without pain within 8 weeks      Patient was seen for 7 visits from 11/20/18 to 12/19/18 with 4 missed appointments.  Patient was making good progress and was close to being discharged, possibly the next visit.  She NS her last scheduled visit.      Therapy will be discontinued at this time.  The patient will need a new referral to resume.    Thank you for your referral.  Brenda Skinner  1/21/2019  9:14 AM    Patient Name: Renetta Joyner  Date: 12/19/2018  Visit #: 7/10  11/20/18-1/19/18  Referral Diagnosis: Neck pain  Referring provider: Nancy Mata*  Visit Diagnosis:     ICD-10-CM    1. Neck and shoulder pain M54.2     M25.519    2. Generalized muscle weakness M62.81    3. Poor posture R29.3          Assessment:     Patient is benefitting from skilled physical therapy and is making steady progress toward functional goals.  Patient is appropriate to continue with skilled physical therapy intervention, as indicated by initial plan of care.  The right UT and LS is not longer painful but did have a little in the right mid rhomboid.  The pain did resolve with the massage today.    Goal Status: ongoing  Pt. will be independent with home exercise program in : 6 weeks    Pt will: Able to turn head to left to drive with pain 0-1/10  "within 8 weeks  Pt will: Able to look down to read or be on phone for 15+ minutes with pain 0-1/10 within 8 weeks  Pt will: Able to lift objects > 8#, such as a gallon of milk, within 8 weeks  Pt will: Able to roll over in bed without pain within 8 weeks      Plan / Patient Education:       ? discharge    Subjective:     Pain Ratin on right                       2 on lower right area    MRT was very helpful.  It took a lot of my pain away  Now my lower right side hurts.    I started a new workout today.  I am starting by walking on the treadmill    Objective:   Functional limitations are described as occurring with: lifting, looking down, turning right, sleeping    Exercises:  Exercise #1: Stretches: supine thoracic towel roll stretch,  UT and LS  Comment #1: hold 30 seconds X 1-3 reps  Exercise #2: chin tuck and scapular retraction   hold 10 seconds x 10 reps  Comment #2: reverse wall push-up   hold 10 seconds X 6-12 reps  Exercise #3: Wall mario  X 20  Comment #3: green tband  shoulder ext  X 20  Exercise #4: green tband   scapular retraction  X 20  Comment #4: green tband   shoulder diagonal  X 20  Exercise #5: \"I\", \"Y\", \"T\"   prone shoulder exercises.  goal: 20 reps      Treatment Today     TREATMENT MINUTES COMMENTS   Evaluation     Self-care/ Home management     Manual therapy 17 Pt sitting in chair, massage to right rhomboid      Neuromuscular Re-education 8 KT, R rhomboid, \"X\", 1/2 tension in middle, paper off tension on tails   Therapeutic Activity     Therapeutic Exercises  See above or flow sheet   Gait training     Modality__________________                Total 25    Blank areas are intentional and mean the treatment did not include these items.       Brenda Skinner, PT  2018    "

## 2021-06-22 NOTE — PROGRESS NOTES
[facility] Daily Progress     Patient Name: Renetta Joyner  Date: 12/10/2018  Visit #: 5/10  11/20/18-18  Referral Diagnosis: Neck pain  Referring provider: Nancy Mata*  Visit Diagnosis:     ICD-10-CM    1. Neck and shoulder pain M54.2     M25.519    2. Generalized muscle weakness M62.81    3. Poor posture R29.3          Assessment:     Patient is benefitting from skilled physical therapy and is making steady progress toward functional goals.  Patient is appropriate to continue with skilled physical therapy intervention, as indicated by initial plan of care.  Patient is not getting any longer lasting results with the massage.  It feels good for awhile then the pain always returns.  Hoping to get longer lasting relief with the addition of the KT today    Goal Status: ongoing  Pt. will be independent with home exercise program in : 6 weeks    Pt will: Able to turn head to left to drive with pain 0-1/10 within 8 weeks  Pt will: Able to look down to read or be on phone for 15+ minutes with pain 0-1/10 within 8 weeks  Pt will: Able to lift objects > 8#, such as a gallon of milk, within 8 weeks  Pt will: Able to roll over in bed without pain within 8 weeks      Plan / Patient Education:       Massage-prn  KT-prn  Strengthening-review prn    Subjective:     Pain Ratin    I am doing the band exercises 2X/day.  I always feel better after exercise.  I like the new exercises from the last visit    Objective:   Functional limitations are described as occurring with: lifting, looking down, turning right, sleeping    Exercises:  Exercise #1: Stretches: supine thoracic towel roll stretch,  UT and LS  Comment #1: hold 30 seconds X 1-3 reps  Exercise #2: chin tuck and scapular retraction   hold 10 seconds x 10 reps  Comment #2: reverse wall push-up   hold 10 seconds X 6-12 reps  Exercise #3: Wall mario  X 20  Comment #3: green tband  shoulder ext  X 20  Exercise #4: green tband   scapular  "retraction  X 20  Comment #4: green tband   shoulder diagonal  X 20  Exercise #5: \"I\", \"Y\", \"T\"   prone shoulder exercises.  goal: 20 reps      Treatment Today     TREATMENT MINUTES COMMENTS   Evaluation     Self-care/ Home management     Manual therapy 15 Pt sitting in chair, massage to right UT, LS, rhomboid, middle trap      Neuromuscular Re-education 8+4 KT, bilateral UT, 5 blocks, paper off tension, distal to proximal   Therapeutic Activity     Therapeutic Exercises  See above or flow sheet   Gait training     Modality__________________                Total 27    Blank areas are intentional and mean the treatment did not include these items.       Brenda Skinner, PT  12/10/2018    "

## 2021-06-22 NOTE — PROGRESS NOTES
[facility] Daily Progress     Patient Name: Renetta Joyner  Date: 2018  Visit #: 6/10  11/20/18-18  Referral Diagnosis: Neck pain  Referring provider: Nancy Mata*  Visit Diagnosis:     ICD-10-CM    1. Neck and shoulder pain M54.2     M25.519    2. Generalized muscle weakness M62.81    3. Poor posture R29.3          Assessment:     Patient is benefitting from skilled physical therapy and is making steady progress toward functional goals.  Patient is appropriate to continue with skilled physical therapy intervention, as indicated by initial plan of care.  Patient finally feels like the combination of the KT and massage have been helpful and getting longer lasting results    Goal Status: ongoing  Pt. will be independent with home exercise program in : 6 weeks    Pt will: Able to turn head to left to drive with pain 0-1/10 within 8 weeks  Pt will: Able to look down to read or be on phone for 15+ minutes with pain 0-1/10 within 8 weeks  Pt will: Able to lift objects > 8#, such as a gallon of milk, within 8 weeks  Pt will: Able to roll over in bed without pain within 8 weeks      Plan / Patient Education:       Massage-prn  KT-prn  Strengthening-review prn    Subjective:     Pain Ratin    I am doing the exercises 2X/day.  I always feel better after exercise.    A lot of the pain in the lower part of my shoulder (infraspinatus) is resolving.  It isn't as painful.    Objective:   Functional limitations are described as occurring with: lifting, looking down, turning right, sleeping    Exercises:  Exercise #1: Stretches: supine thoracic towel roll stretch,  UT and LS  Comment #1: hold 30 seconds X 1-3 reps  Exercise #2: chin tuck and scapular retraction   hold 10 seconds x 10 reps  Comment #2: reverse wall push-up   hold 10 seconds X 6-12 reps  Exercise #3: Wall mario  X 20  Comment #3: green tband  shoulder ext  X 20  Exercise #4: green tband   scapular retraction  X 20  Comment #4:  "green tband   shoulder diagonal  X 20  Exercise #5: \"I\", \"Y\", \"T\"   prone shoulder exercises.  goal: 20 reps      Treatment Today     TREATMENT MINUTES COMMENTS   Evaluation     Self-care/ Home management     Manual therapy 17 Pt sitting in chair, massage to right UT, LS, rhomboid, middle trap  MRT right UT      Neuromuscular Re-education 8 KT, bilateral UT, 5 blocks, paper off tension, distal to proximal   Therapeutic Activity     Therapeutic Exercises  See above or flow sheet   Gait training     Modality__________________                Total 25    Blank areas are intentional and mean the treatment did not include these items.       Brenda Skinner, PT  12/12/2018    "

## 2021-06-22 NOTE — PROGRESS NOTES
[facility] Daily Progress     Patient Name: Renetta Joyner  Date: 12/3/2018  Visit #: 3/10  11/20/18-18  Referral Diagnosis: Neck pain  Referring provider: Nancy Mata*  Visit Diagnosis:     ICD-10-CM    1. Neck and shoulder pain M54.2     M25.519    2. Generalized muscle weakness M62.81    3. Poor posture R29.3          Assessment:     Patient is benefitting from skilled physical therapy and is making steady progress toward functional goals.  Patient is appropriate to continue with skilled physical therapy intervention, as indicated by initial plan of care.  Patient feels the stretches have been very helpful as well as the massage.  She is starting to  Be able to do more with less symptoms    Goal Status: ongoing  Pt. will be independent with home exercise program in : 6 weeks    Pt will: Able to turn head to left to drive with pain 0-1/10 within 8 weeks  Pt will: Able to look down to read or be on phone for 15+ minutes with pain 0-1/10 within 8 weeks  Pt will: Able to lift objects > 8#, such as a gallon of milk, within 8 weeks  Pt will: Able to roll over in bed without pain within 8 weeks      Plan / Patient Education:       Massage  KT  Strengthening when able    Subjective:     Pain Ratin-3    I feel slightly better.  It is getting a little easier to turn head to change lanes while driving    Objective:   Functional limitations are described as occurring with: lifting, looking down, turning right, sleeping    Exercises:  Exercise #1: Stretches: supine thoracic towel roll stretch,  UT and LS  Comment #1: hold 30 seconds X 1-3 reps  Exercise #2: chin tuck and scapular retraction   hold 10 seconds x 10 reps  Comment #2: reverse wall push-up   hold 10 seconds X 6-12 reps  Exercise #3: Wall mario  X 20  Comment #3: green tband  shoulder ext  X 20  Exercise #4: green tband   scapular retraction  X 20  Comment #4: green tband   shoulder diagonal  X 20      Treatment Today     TREATMENT  MINUTES COMMENTS   Evaluation     Self-care/ Home management     Manual therapy 15 Pt sitting in chair, massage to right UT, LS, rhomboid, middle trap      Neuromuscular Re-education     Therapeutic Activity     Therapeutic Exercises 10 See above or flow sheet   Gait training     Modality__________________                Total 25    Blank areas are intentional and mean the treatment did not include these items.       Brenda Skinner, PT  12/3/2018

## 2021-06-22 NOTE — PROGRESS NOTES
"[facility] Daily Progress     Patient Name: Renetta Joyner  Date: 2018  Visit #: 4/10  11/20/18-18  Referral Diagnosis: Neck pain  Referring provider: Nancy Mata*  Visit Diagnosis:     ICD-10-CM    1. Neck and shoulder pain M54.2     M25.519    2. Generalized muscle weakness M62.81    3. Poor posture R29.3          Assessment:     Patient is benefitting from skilled physical therapy and is making steady progress toward functional goals.  Patient is appropriate to continue with skilled physical therapy intervention, as indicated by initial plan of care.  Patient had no pain today after massage.  She feels better with the movement of the exercises and her strengthening has helped with this.    Goal Status: ongoing  Pt. will be independent with home exercise program in : 6 weeks    Pt will: Able to turn head to left to drive with pain 0-1/10 within 8 weeks  Pt will: Able to look down to read or be on phone for 15+ minutes with pain 0-1/10 within 8 weeks  Pt will: Able to lift objects > 8#, such as a gallon of milk, within 8 weeks  Pt will: Able to roll over in bed without pain within 8 weeks      Plan / Patient Education:       Massage-prn  KT-prn  Strengthening-review prn    Subjective:     Pain Ratin    I am doing the band exercises 2X/day.  I always feel better after exercise    Objective:   Functional limitations are described as occurring with: lifting, looking down, turning right, sleeping    Exercises:  Exercise #1: Stretches: supine thoracic towel roll stretch,  UT and LS  Comment #1: hold 30 seconds X 1-3 reps  Exercise #2: chin tuck and scapular retraction   hold 10 seconds x 10 reps  Comment #2: reverse wall push-up   hold 10 seconds X 6-12 reps  Exercise #3: Wall mario  X 20  Comment #3: green tband  shoulder ext  X 20  Exercise #4: green tband   scapular retraction  X 20  Comment #4: green tband   shoulder diagonal  X 20  Exercise #5: \"I\", \"Y\", \"T\"   prone shoulder " exercises.  goal: 20 reps      Treatment Today     TREATMENT MINUTES COMMENTS   Evaluation     Self-care/ Home management     Manual therapy 15 Pt sitting in chair, massage to right UT, LS, rhomboid, middle trap      Neuromuscular Re-education     Therapeutic Activity     Therapeutic Exercises 10 See above or flow sheet   Gait training     Modality__________________                Total 25    Blank areas are intentional and mean the treatment did not include these items.       Brenda Skinner, PT  12/5/2018

## 2021-06-23 NOTE — PROGRESS NOTES
ASSESSMENT/PLAN:       1. Pain of toe of right foot  -X-ray to me appears unremarkable, exam is really impressive for limited range of motion at the interphalangeal joint however at the great toe.  Referral to orthopedics placed for evaluation and assessment.  - XR Toe Right 2 or More VWS; Future  - Ambulatory referral to Orthopedics    2. Neck pain  -Thankfully this is improved, neuro exam continues to be unremarkable.  She will continue to do her exercises and if she continues to improve we will continue to monitor.  If she starts developing symptoms down her arms, worsening neck pain again we will proceed with cervical spine MRI.    3. Nonintractable headache, unspecified chronicity pattern, unspecified headache type  -Thankfully she has been headache free now for the last 2 months.  Continue to monitor for now and proceed with CT exam of the head of her headaches return.  She will keep me informed if they do.    4. Allergic rhinitis  -Doing well with the Flonase, renewing today, follow-up in 1 year.  - fluticasone (FLONASE) 50 mcg/actuation nasal spray; 2 sprays into each nostril daily.  Dispense: 48 g; Refill: 3      Return in about 6 weeks (around 3/25/2019) for Annual physical.      Nancy Mata MD      PROGRESS NOTE   2/11/2019    SUBJECTIVE:  Renetta Joyner is a 48 y.o. female  who presents for follow up.     The patient was seen in December for headaches that were happening on a fairly regular basis as well as neck pain.  She also was having elevated blood pressures.  Since she was here last she notes that her headaches have nearly been gone.  Her neck pain with continued exercise has also improved.  She does feel that it is about 40% better now.    She has been checking her blood pressures at home and typically gets in the 120s over 80s range.    She does stil have neck pain, but it is better. She does have more issues with stress, beingmore tired. She does think that it is 40% better from  last time. When she changes sides now she doesn't feel pain at night. Prior to being in last time she chouldn't do this without pain. She is working out 5 days a week, she has joined [employer]. She is walking on the treadmill and core exercises. She does use heat as well.     She does have toe pain on the right great toe. It has been four months since she kicked a soccer ball. She cannot bend the toe. She has more pain with heels. She will not wear heels due tot his. She will wear flats. She cannot bend the toe when she is laying flat on the bed. She cannot get it all the way down.   Chief Complaint   Patient presents with     Follow-up     Patient is here today for a one month follow up in regards to her headaches, blood pressure and neck pain. Patient is no longer having the headaches, the neck pain has slightly improved with physical therapy.      Toe Pain     Patient was seen in October 2018 after a injury to her big toe of the right foot. She does still have pain when trying to bend the toe.          Patient Active Problem List   Diagnosis     Fatigue     Glossodynia     Allergic Rhinitis     Iron Deficiency Anemia Secondary To Inadequate Dietary Iron Intake     Carpal Tunnel Syndrome     Ganglion     Menorrhagia     Ovarian Cyst     Vitamin D deficiency     Foot pain, right       Current Outpatient Medications   Medication Sig Dispense Refill     cholecalciferol, vitamin D3, (VITAMIN D3) 2,000 unit cap Take by mouth.       ferrous sulfate (IRON, FERROUS SULFATE,) 325 (65 FE) MG tablet Take 1 tablet by mouth daily with breakfast.       fluticasone (FLONASE) 50 mcg/actuation nasal spray 2 sprays into each nostril daily. 48 g 3     loratadine (CLARITIN) 10 mg tablet Take 10 mg by mouth daily.       albuterol (PROAIR HFA;PROVENTIL HFA;VENTOLIN HFA) 90 mcg/actuation inhaler Inhale 2 puffs every 6 (six) hours as needed for wheezing. 18 g 1     No current facility-administered medications for this visit.         Social History     Tobacco Use   Smoking Status Never Smoker   Smokeless Tobacco Never Used           OBJECTIVE:        No results found for this or any previous visit (from the past 240 hour(s)).    Vitals:    02/11/19 1357   BP: 110/70   Patient Site: Left Arm   Patient Position: Sitting   Cuff Size: Adult Regular   Pulse: 90   SpO2: 99%   Weight: 172 lb 9.6 oz (78.3 kg)     Weight: 172 lb 9.6 oz (78.3 kg)          Physical Exam:  GENERAL APPEARANCE: A&A, NAD, well hydrated, well nourished  SKIN:  Normal skin turgor, no lesions/rashes   HEENT: moist mucous membranes, no rhinorrhea, tympanic membranes are clear bilaterally  NECK: Normal  CV: RRR, no M/G/R   LUNGS: CTAB   EXTREMITY: no edema, right great toe with tenderness to palpation over the medial aspect of the interphalangeal joint, decreased range of motion at this joint as well, no skin changes  NEURO: no gross deficits     Xr Toe Right 2 Or More Vws    Result Date: 2/11/2019  [facility] RADIOLOGY EXAM: XR TOE RIGHT 2 OR MORE VWS LOCATION: [facility] DATE/TIME: 2/11/2019 2:27 PM INDICATION: Pain over ip joint x a few months following soccer injury COMPARISON: None. FINDINGS: Negative toes. No fracture or dislocation.

## 2021-06-27 ENCOUNTER — HEALTH MAINTENANCE LETTER (OUTPATIENT)
Age: 51
End: 2021-06-27

## 2021-08-03 ENCOUNTER — HOSPITAL ENCOUNTER (OUTPATIENT)
Dept: ULTRASOUND IMAGING | Facility: CLINIC | Age: 51
Discharge: HOME | End: 2021-08-03
Attending: FAMILY MEDICINE | Admitting: FAMILY MEDICINE
Payer: COMMERCIAL

## 2021-08-03 DIAGNOSIS — N93.9 VAGINAL BLEEDING: ICD-10-CM

## 2021-08-03 PROCEDURE — 76830 TRANSVAGINAL US NON-OB: CPT

## 2021-08-13 ENCOUNTER — TELEPHONE (OUTPATIENT)
Dept: FAMILY MEDICINE | Facility: CLINIC | Age: 51
End: 2021-08-13

## 2021-08-13 NOTE — TELEPHONE ENCOUNTER
----- Message from Nancy Mata MD sent at 8/12/2021  4:04 PM CDT -----  Your ultrasound of the pelvis showed no abnormalities in the uterus which is great news. If you are still having a lot of bleeding please let me know.     You do have a cyst on the ovary and we should repeat an ultrasound of this in 6 months, I have set a reminder for myself to get this ordered.     Please let me know if you have any questions/concerns.

## 2021-09-02 ENCOUNTER — TRANSFERRED RECORDS (OUTPATIENT)
Dept: HEALTH INFORMATION MANAGEMENT | Facility: CLINIC | Age: 51
End: 2021-09-02

## 2021-09-10 ENCOUNTER — HOSPITAL ENCOUNTER (OUTPATIENT)
Dept: MAMMOGRAPHY | Facility: CLINIC | Age: 51
Discharge: HOME | End: 2021-09-10
Attending: FAMILY MEDICINE | Admitting: FAMILY MEDICINE
Payer: COMMERCIAL

## 2021-09-10 DIAGNOSIS — Z12.31 VISIT FOR SCREENING MAMMOGRAM: ICD-10-CM

## 2021-09-10 PROCEDURE — 77067 SCR MAMMO BI INCL CAD: CPT

## 2021-10-17 ENCOUNTER — HEALTH MAINTENANCE LETTER (OUTPATIENT)
Age: 51
End: 2021-10-17

## 2022-02-17 DIAGNOSIS — N83.201 CYST OF RIGHT OVARY: Primary | ICD-10-CM

## 2022-02-28 ENCOUNTER — HOSPITAL ENCOUNTER (OUTPATIENT)
Dept: ULTRASOUND IMAGING | Facility: CLINIC | Age: 52
Discharge: HOME | End: 2022-02-28
Attending: FAMILY MEDICINE | Admitting: FAMILY MEDICINE
Payer: COMMERCIAL

## 2022-02-28 DIAGNOSIS — N83.201 CYST OF RIGHT OVARY: ICD-10-CM

## 2022-02-28 PROCEDURE — 76830 TRANSVAGINAL US NON-OB: CPT

## 2022-03-04 ENCOUNTER — OFFICE VISIT (OUTPATIENT)
Dept: FAMILY MEDICINE | Facility: CLINIC | Age: 52
End: 2022-03-04
Payer: COMMERCIAL

## 2022-03-04 ENCOUNTER — HOSPITAL ENCOUNTER (EMERGENCY)
Facility: CLINIC | Age: 52
Discharge: HOME | End: 2022-03-04
Attending: EMERGENCY MEDICINE | Admitting: EMERGENCY MEDICINE
Payer: COMMERCIAL

## 2022-03-04 VITALS
DIASTOLIC BLOOD PRESSURE: 66 MMHG | HEART RATE: 88 BPM | OXYGEN SATURATION: 100 % | BODY MASS INDEX: 28.49 KG/M2 | SYSTOLIC BLOOD PRESSURE: 120 MMHG | WEIGHT: 166 LBS

## 2022-03-04 VITALS
SYSTOLIC BLOOD PRESSURE: 139 MMHG | TEMPERATURE: 99.3 F | HEART RATE: 96 BPM | BODY MASS INDEX: 28.49 KG/M2 | RESPIRATION RATE: 16 BRPM | OXYGEN SATURATION: 100 % | WEIGHT: 166 LBS | DIASTOLIC BLOOD PRESSURE: 74 MMHG

## 2022-03-04 DIAGNOSIS — N89.8 VAGINAL IRRITATION: ICD-10-CM

## 2022-03-04 DIAGNOSIS — N93.9 VAGINAL SPOTTING: ICD-10-CM

## 2022-03-04 DIAGNOSIS — R30.0 DYSURIA: Primary | ICD-10-CM

## 2022-03-04 DIAGNOSIS — N93.9 VAGINAL BLEEDING: ICD-10-CM

## 2022-03-04 LAB
ALBUMIN UR-MCNC: NEGATIVE MG/DL
APPEARANCE UR: CLEAR
BACTERIA #/AREA URNS HPF: ABNORMAL /HPF
BILIRUB UR QL STRIP: NEGATIVE
CLUE CELLS: ABNORMAL
COLOR UR AUTO: YELLOW
ERYTHROCYTE [DISTWIDTH] IN BLOOD BY AUTOMATED COUNT: 17.3 % (ref 10–15)
GLUCOSE UR STRIP-MCNC: NEGATIVE MG/DL
HCT VFR BLD AUTO: 27.6 % (ref 35–47)
HGB BLD-MCNC: 7.8 G/DL (ref 11.7–15.7)
HGB UR QL STRIP: ABNORMAL
KETONES UR STRIP-MCNC: NEGATIVE MG/DL
LEUKOCYTE ESTERASE UR QL STRIP: NEGATIVE
MCH RBC QN AUTO: 18.9 PG (ref 26.5–33)
MCHC RBC AUTO-ENTMCNC: 28.3 G/DL (ref 31.5–36.5)
MCV RBC AUTO: 67 FL (ref 78–100)
NITRATE UR QL: NEGATIVE
PH UR STRIP: 6.5 [PH] (ref 5–8)
PLATELET # BLD AUTO: 306 10E3/UL (ref 150–450)
RBC # BLD AUTO: 4.13 10E6/UL (ref 3.8–5.2)
RBC #/AREA URNS AUTO: ABNORMAL /HPF
SP GR UR STRIP: 1.02 (ref 1–1.03)
SQUAMOUS #/AREA URNS AUTO: ABNORMAL /LPF
TRICHOMONAS, WET PREP: ABNORMAL
UROBILINOGEN UR STRIP-ACNC: 0.2 E.U./DL
WBC # BLD AUTO: 7.3 10E3/UL (ref 4–11)
WBC #/AREA URNS AUTO: ABNORMAL /HPF
WBC'S/HIGH POWER FIELD, WET PREP: ABNORMAL
YEAST, WET PREP: ABNORMAL

## 2022-03-04 PROCEDURE — G0123 SCREEN CERV/VAG THIN LAYER: HCPCS | Performed by: NURSE PRACTITIONER

## 2022-03-04 PROCEDURE — 36415 COLL VENOUS BLD VENIPUNCTURE: CPT | Performed by: EMERGENCY MEDICINE

## 2022-03-04 PROCEDURE — 81001 URINALYSIS AUTO W/SCOPE: CPT | Performed by: NURSE PRACTITIONER

## 2022-03-04 PROCEDURE — 87210 SMEAR WET MOUNT SALINE/INK: CPT | Performed by: NURSE PRACTITIONER

## 2022-03-04 PROCEDURE — 99283 EMERGENCY DEPT VISIT LOW MDM: CPT

## 2022-03-04 PROCEDURE — 99213 OFFICE O/P EST LOW 20 MIN: CPT | Performed by: NURSE PRACTITIONER

## 2022-03-04 PROCEDURE — 87624 HPV HI-RISK TYP POOLED RSLT: CPT | Performed by: NURSE PRACTITIONER

## 2022-03-04 PROCEDURE — 85027 COMPLETE CBC AUTOMATED: CPT | Performed by: EMERGENCY MEDICINE

## 2022-03-04 NOTE — ED TRIAGE NOTES
Vaginal bleeding started yesterday.  She went to PMD and had basic UA and yeast infection test which were normal.  She had an US earlier this week and had resolving ovarian cyst.  She continues to have bleeding today and slight abd pain

## 2022-03-04 NOTE — PROGRESS NOTES
"  Assessment & Plan     Dysuria    - UA Macro with Reflex to Micro and Culture - lab collect  - UA Macro with Reflex to Micro and Culture - lab collect  - Urine Microscopic  No signs of urinary infection today    Recent Results (from the past 24 hour(s))   UA Macro with Reflex to Micro and Culture - lab collect    Collection Time: 03/04/22 11:03 AM    Specimen: Urine, Midstream   Result Value Ref Range    Color Urine Yellow Colorless, Straw, Light Yellow, Yellow    Appearance Urine Clear Clear    Glucose Urine Negative Negative mg/dL    Bilirubin Urine Negative Negative    Ketones Urine Negative Negative mg/dL    Specific Gravity Urine 1.025 1.005 - 1.030    Blood Urine Large (A) Negative    pH Urine 6.5 5.0 - 8.0    Protein Albumin Urine Negative Negative mg/dL    Urobilinogen Urine 0.2 0.2, 1.0 E.U./dL    Nitrite Urine Negative Negative    Leukocyte Esterase Urine Negative Negative         Vaginal irritation    - Wet prep - Clinic Collect  Results of wet prep pending    Vaginal spotting    - Pap screen with HPV - recommended age 30 - 65 years  Results of pap pending  I did advise the patient to go to the nearest emergency department over the weekend if her bleeding continues or becomes worse along with clotting.  And or if she becomes symptomatic such as dizziness, fatigue or fainting.  We did review the results from her recent pelvic ultrasound which was negative for any acute changes, she did have some ovarian cyst that were resolving.  The lining of the endometrium was normal.  No changes of uterus were noted on her ultrasound        Review of prior external note(s) from - Outside records from [facility] UA results 3/3/2022         BMI:   Estimated body mass index is 28.49 kg/m  as calculated from the following:    Height as of 5/13/21: 1.626 m (5' 4\").    Weight as of this encounter: 75.3 kg (166 lb).   Weight management plan: Discussed healthy diet and exercise guidelines        Return in about 1 week (around " 3/11/2022), or if symptoms worsen or fail to improve, for vaginal spotting.    Kaylee Amado NP  [facility] Center[facility]AYLI Jackson is a 52 year old who presents for the following health issues     HPI     Genitourinary - Female  Onset/Duration: one day  Description:   Painful urination (Dysuria): YES           Frequency: no  Blood in urine (Hematuria): YES- large amount in urine sample, suspect from vaginal bleeding  Delay in urine (Hesitency): no  Intensity: 3/10  Progression of Symptoms:  worsening and constant  Accompanying Signs & Symptoms:  Fever/chills: no  Flank pain: no  Nausea and vomiting: no  Vaginal symptoms: abnormal vaginal bleeding LMP: 2/15- 2/23/22  Abdominal/Pelvic Pain: YES- lower midline abdomen, comes and goes. Recent pelvic US on 2/28/22 was normal  History:   History of frequent UTI s: no  History of kidney stones: no  Sexually Active: YES-   Possibility of pregnancy: No  Precipitating or alleviating factors: increased water intake  Therapies tried and outcome: Increase fluid intake        Review of Systems   CONSTITUTIONAL: NEGATIVE for fever, chills, change in weight  ENT/MOUTH: NEGATIVE for ear, mouth and throat problems  RESP: NEGATIVE for significant cough or SOB  CV: NEGATIVE for chest pain, palpitations or peripheral edema      Objective    There were no vitals taken for this visit.  There is no height or weight on file to calculate BMI.  Physical Exam   GENERAL: healthy, alert and no distress  NECK: no adenopathy, no asymmetry, masses, or scars and thyroid normal to palpation  RESP: lungs clear to auscultation - no rales, rhonchi or wheezes  CV: regular rate and rhythm, normal S1 S2, no S3 or S4, no murmur, click or rub, no peripheral edema and peripheral pulses strong  ABDOMEN: soft, nontender, no hepatosplenomegaly, no masses and bowel sounds normal   (female): normal female external genitalia, normal urethral meatus, vaginal mucosa, normal  cervix/adnexa/uterus without masses or discharge. Small amount of bleeding present today on exam, no clotting presence.   RECTAL: normal appearance  BACK: no CVA tenderness, no paralumbar tenderness

## 2022-03-04 NOTE — ED PROVIDER NOTES
EMERGENCY DEPARTMENT ENCOUNTER      NAME: Renetta Joyner  AGE: 52 year old female  YOB: 1970  MRN: [id number]  EVALUATION DATE & TIME: 3/4/2022  2:13 PM    PCP: Nancy Mata    ED PROVIDER: Corrie Durand MD    Chief Complaint   Patient presents with     Vaginal Bleeding     Abdominal Pain         FINAL IMPRESSION:  1. Vaginal bleeding          ED COURSE & MEDICAL DECISION MAKING:    Pertinent Labs & Imaging studies reviewed. (See chart for details)  52 year old female with history of iron deficiency anemia, vitamin D deficiency and frequent menses (typically cycle 2.5-3 weeks) who presents to the Emergency Department for evaluation of spotting since yesterday.  US of pelvis 4d ago without anatomic abnormalities.  UA, wet prep at clinic today unremarkable.  Bleeding is light - spotting, 2.5 weeks from her last menses.  May be the start of her next menses are some abnormal intercycle bleeding. Hemoglobin today 7.8, chronic.  Encouraged to continue iron supplementation.   Regardless, patient is stable for discharge to home.  Discussed if this bleeding continues longer than her usual 7-9 day menses, she should follow up with her pcp.        ED Course as of 03/04/22 2128   Fri Mar 04, 2022   1440 I met with the patient, obtained history, performed an initial exam, and discussed options and plan for diagnostics and treatment here in the ED.   1446 Hemoglobin(!): 7.8  Baseline    1450 Checked in on and updated patient. I discussed the plan for discharge with the patient, and patient is agreeable.  We discussed supportive cares at home and reasons for return to the ER including new or worsening symptoms - all questions and concerns addressed.  Patient to be discharged by RN.          At the conclusion of the encounter I discussed the results of all of the tests and the disposition. The questions were answered. The patient or family acknowledged understanding and was agreeable with the care  plan.    MEDICATIONS GIVEN IN THE EMERGENCY:  Medications - No data to display    NEW PRESCRIPTIONS STARTED AT TODAY'S ER VISIT  Discharge Medication List as of 3/4/2022  2:56 PM             =================================================================    HPI    Patient information was obtained from: patient     Use of Intrepreter: N/A      Renetta Joyner is a 52 year old female with pertinent medical history of anemia, menorrhagia, and ovarian cyst who presents for vaginal bleeding.     Yesterday, patient noticed that when she urinated and wiped, there was a small amount of brownish-red blood on the paper. This morning, patient noticed a light flow of vaginal bleeding to the point where she put on a small pad. Patient presented to her primary care provider and had a urinalysis and yeast swab which were both negative. She also had an ultrasound and pelvic exam which were both normal.     Patient presented here because she is still experiencing vaginal bleeding along with some mild intermittent burning sensation around her vagina. She was also feeling some mild suprapubic abdominal cramping but this has since resolved. Patient feels that her bleeding is heavier when she urinates and wipes.     Patient LMP 2/15/2022-2022. She reports that her menstrual cycle is usually around 20-21 days long, so she get her period every 2.5 weeks. Patient denies any other complaints at this time.     REVIEW OF SYSTEMS  Constitutional:  Denies fever, chills, weight loss or weakness  GI:  Positive for abdominal pain (suprapubic). Denies nausea, vomiting, diarrhea  : Positive for vaginal bleeding and vaginal burning. Denies dysuria, denies hematuria  All other systems negative unless noted in HPI.      PAST MEDICAL HISTORY:  Past Medical History:   Diagnosis Date     Abdominal pain, left lower quadrant      Cough      History of pregnancy      - One  5/15/00, then 3 C-sections     Impetigo      Iron deficiency  anemia secondary to inadequate dietary iron intake      Nonspecific abnormal results of thyroid function study      Other disorder of menstruation and other abnormal bleeding from female genital tract      Unspecified vitamin D deficiency     resolved       PAST SURGICAL HISTORY:  Past Surgical History:   Procedure Laterality Date      SECTION  2002    Son Marley      SECTION  2006    Daughter Adam      SECTION  2009    Daughter Carrie.      TUBAL LIGATION  2009    With last        CURRENT MEDICATIONS:    Prior to Admission Medications   Prescriptions Last Dose Informant Patient Reported? Taking?   -iron-FA-om-3s-dha-epa (VITAFOL GUMMIES) 3.33 mg iron- 0.33 mg Chew   Yes No   Sig: [-IRON-FA-OM-3S-DHA-EPA (VITAFOL GUMMIES) 3.33 MG IRON- 0.33 MG CHEW] Chew daily.   Patient not taking: Reported on 3/4/2022   cholecalciferol, vitamin D3, 50 mcg (2,000 unit) capsule   Yes No   Sig: [CHOLECALCIFEROL, VITAMIN D3, 50 MCG (2,000 UNIT) CAPSULE] Take 1,000 Units by mouth daily.   Patient not taking: Reported on 3/4/2022   loratadine (CLARITIN) 10 mg tablet   Yes No   Sig: [LORATADINE (CLARITIN) 10 MG TABLET] Take 10 mg by mouth daily.   ondansetron (ZOFRAN-ODT) 4 MG disintegrating tablet   No No   Sig: [ONDANSETRON (ZOFRAN-ODT) 4 MG DISINTEGRATING TABLET] Take 1 tablet (4 mg total) by mouth every 8 (eight) hours as needed.   prochlorperazine (COMPAZINE) 25 MG suppository   No No   Sig: [PROCHLORPERAZINE (COMPAZINE) 25 MG SUPPOSITORY] Insert 1 suppository (25 mg total) into the rectum every 12 (twelve) hours as needed for nausea.      Facility-Administered Medications: None       ALLERGIES:  Allergies   Allergen Reactions     Carbamates Itching     Cat Dander [Animal Dander] Cough     Propylene Glycol Itching       FAMILY HISTORY:  Family History   Problem Relation Age of Onset     Asthma Father      Breast Cancer Maternal Aunt 52.00     Breast Cancer  Maternal Aunt 67.00     Breast Cancer Cousin 40.00     Pernicious anemia Brother         2 brothers with B12 deficiency, not known if pernicious anemia.       SOCIAL HISTORY:  Social History     Tobacco Use     Smoking status: Never Smoker     Smokeless tobacco: Never Used   Substance Use Topics     Alcohol use: No     Drug use: No        VITALS:  Patient Vitals for the past 24 hrs:   BP Temp Temp src Pulse Resp SpO2 Weight   03/04/22 1500 139/74 -- -- 96 16 100 % --   03/04/22 1412 (!) 161/103 99.3  F (37.4  C) Oral 92 16 100 % 75.3 kg (166 lb)       PHYSICAL EXAM    General Appearance: Well-appearing, well-nourished, no acute distress   Head:  Normocephalic  Eyes:  conjunctiva/corneas clear  ENT: membranes are moist without pallor  Neck:  Supple  Cardio:  Regular rate and rhythm  Pulm:  No respiratory distress  Back:  No CVA tenderness, normal ROM  Abdomen:  Soft, non-tender, non distended,no rebound or guarding.  Extremities:  CHEEMA normally, normal gait  Skin:  Skin warm, dry, no rashes  Neuro:  Alert and oriented ×3, moving all extremities, no gross sensory defects     RADIOLOGY/LABS:  Reviewed all pertinent imaging. Please see official radiology report. All pertinent labs reviewed and interpreted.    Results for orders placed or performed during the hospital encounter of 03/04/22   CBC (+ platelets, no diff)   Result Value Ref Range    WBC Count 7.3 4.0 - 11.0 10e3/uL    RBC Count 4.13 3.80 - 5.20 10e6/uL    Hemoglobin 7.8 (L) 11.7 - 15.7 g/dL    Hematocrit 27.6 (L) 35.0 - 47.0 %    MCV 67 (L) 78 - 100 fL    MCH 18.9 (L) 26.5 - 33.0 pg    MCHC 28.3 (L) 31.5 - 36.5 g/dL    RDW 17.3 (H) 10.0 - 15.0 %    Platelet Count 306 150 - 450 10e3/uL       The creation of this record is based on the scribe s observations of the work being performed by Corrie Durand MD and the provider s statements to them. It was created on his behalf by Kandis Catalan, a trained medical scribe. This document has been checked and approved by  the attending provider.    Corrie Durand MD  Emergency Medicine  [facility] EMERGENCY ROOM  [address] [facility] [address] [phone]  Dept: [phone]     Corrie Durand MD  03/04/22 2129

## 2022-03-04 NOTE — DISCHARGE INSTRUCTIONS
Your hemoglobin today was 7.8.  This is unchanged from 8.0, 8 months ago.  Continue iron supplementation.      Given your menses typically are every 2.5 weeks, this may simply be the start of your next menses.  Or may be some abnormal inter-cycle bleeding/spotting.

## 2022-03-08 LAB
HUMAN PAPILLOMA VIRUS 16 DNA: NEGATIVE
HUMAN PAPILLOMA VIRUS 18 DNA: NEGATIVE
HUMAN PAPILLOMA VIRUS FINAL DIAGNOSIS: NORMAL
HUMAN PAPILLOMA VIRUS OTHER HR: NEGATIVE

## 2022-03-11 LAB
BKR LAB AP GYN ADEQUACY: NORMAL
BKR LAB AP GYN INTERPRETATION: NORMAL
BKR LAB AP HPV REFLEX: NORMAL
BKR LAB AP LMP: NORMAL
BKR LAB AP PREVIOUS ABNORMAL: NORMAL
PATH REPORT.COMMENTS IMP SPEC: NORMAL
PATH REPORT.COMMENTS IMP SPEC: NORMAL
PATH REPORT.RELEVANT HX SPEC: NORMAL

## 2022-05-03 ENCOUNTER — OFFICE VISIT (OUTPATIENT)
Dept: FAMILY MEDICINE | Facility: CLINIC | Age: 52
End: 2022-05-03
Payer: COMMERCIAL

## 2022-05-03 VITALS
BODY MASS INDEX: 28.17 KG/M2 | HEART RATE: 82 BPM | OXYGEN SATURATION: 100 % | HEIGHT: 64 IN | DIASTOLIC BLOOD PRESSURE: 58 MMHG | SYSTOLIC BLOOD PRESSURE: 110 MMHG | WEIGHT: 165 LBS

## 2022-05-03 DIAGNOSIS — Z00.00 ENCOUNTER FOR ROUTINE HISTORY AND PHYSICAL EXAMINATION OF ADULT: Primary | ICD-10-CM

## 2022-05-03 DIAGNOSIS — E55.9 VITAMIN D DEFICIENCY: ICD-10-CM

## 2022-05-03 DIAGNOSIS — Z13.220 LIPID SCREENING: ICD-10-CM

## 2022-05-03 DIAGNOSIS — L23.5 ALLERGIC DERMATITIS DUE TO OTHER CHEMICAL PRODUCT: ICD-10-CM

## 2022-05-03 DIAGNOSIS — E53.8 VITAMIN B12 DEFICIENCY (NON ANEMIC): ICD-10-CM

## 2022-05-03 DIAGNOSIS — Z13.1 SCREENING FOR DIABETES MELLITUS: ICD-10-CM

## 2022-05-03 PROCEDURE — 99396 PREV VISIT EST AGE 40-64: CPT | Performed by: FAMILY MEDICINE

## 2022-05-03 RX ORDER — METHYLPREDNISOLONE 4 MG
TABLET, DOSE PACK ORAL
Qty: 21 TABLET | Refills: 0 | Status: SHIPPED | OUTPATIENT
Start: 2022-05-03 | End: 2023-06-13

## 2022-05-03 NOTE — PROGRESS NOTES
SUBJECTIVE:   CC: Renetta Joyner is an 52 year old woman who presents for preventive health visit.     Healthy Habits:     Getting at least 3 servings of Calcium per day:  Yes    Bi-annual eye exam:  Yes    Dental care twice a year:  Yes    Sleep apnea or symptoms of sleep apnea:  None    Diet:  Regular (no restrictions)    Frequency of exercise:  None    Taking medications regularly:  Yes    PHQ-2 Total Score: 0    Additional concerns today:  Yes    She has been itchy again since using hair dye. She does take claritin and that is helping some, is using vaseline as well. She does have more issues at night.     She does wonder about the shingles vaccine with her propylene glycol allergy.     Today's PHQ-2 Score:   PHQ-2 ( 1999 Pfizer) 5/3/2022   Q1: Little interest or pleasure in doing things 0   Q2: Feeling down, depressed or hopeless 0   PHQ-2 Score 0   Q1: Little interest or pleasure in doing things Not at all   Q2: Feeling down, depressed or hopeless Not at all   PHQ-2 Score 0       Abuse: Current or Past (Physical, Sexual or Emotional) - No  Do you feel safe in your environment? Yes    Have you ever done Advance Care Planning? (For example, a Health Directive, POLST, or a discussion with a medical provider or your loved ones about your wishes): No, advance care planning information given to patient to review.  Patient declined advance care planning discussion at this time.    Social History     Tobacco Use     Smoking status: Never Smoker     Smokeless tobacco: Never Used   Substance Use Topics     Alcohol use: No       Alcohol Use 5/3/2022   Prescreen: >3 drinks/day or >7 drinks/week? Not Applicable       Reviewed orders with patient.  Reviewed health maintenance and updated orders accordingly - Yes      Breast Cancer Screening:    FHS-7:   Breast CA Risk Assessment (FHS-7) 9/10/2021 5/3/2022   Did any of your first-degree relatives have breast or ovarian cancer? Yes No   Did any of your relatives have  "bilateral breast cancer? No Unknown   Did any man in your family have breast cancer? No No   Did any woman in your family have breast and ovarian cancer? No Yes   Did any woman in your family have breast cancer before age 50 y? No Yes   Do you have 2 or more relatives with breast and/or ovarian cancer? Yes Yes   Do you have 2 or more relatives with breast and/or bowel cancer? Yes Yes       Mammogram Screening: Recommended annual mammography  Pertinent mammograms are reviewed under the imaging tab.    History of abnormal Pap smear: NO - age 30-65 PAP every 5 years with negative HPV co-testing recommended  PAP / HPV Latest Ref Rng & Units 3/4/2022 10/22/2018   PAP   Negative for Intraepithelial Lesion or Malignancy (NILM) Negative for squamous intraepithelial lesion or malignancy  Electronically signed by Cheryl Juarez CT (ASCP) on 10/26/2018 at  3:33 PM     HPV16 Negative Negative Negative   HPV18 Negative Negative Negative   HRHPV Negative Negative Negative     Reviewed and updated as needed this visit by clinical staff   Tobacco  Allergies  Meds  Problems  Med Hx  Surg Hx  Fam Hx            Reviewed and updated as needed this visit by Provider   Tobacco  Allergies  Meds  Problems  Med Hx  Surg Hx  Fam Hx               Review of Systems  Per above     OBJECTIVE:   /58   Pulse 82   Ht 1.626 m (5' 4\")   Wt 74.8 kg (165 lb)   LMP 04/27/2022 (Exact Date)   SpO2 100%   Breastfeeding No   BMI 28.32 kg/m    Physical Exam  GENERAL: healthy, alert and no distress  EYES: Eyes grossly normal to inspection, PERRL and conjunctivae and sclerae normal  HENT: ear canals and TM's normal, nose and mouth without ulcers or lesions  NECK: no adenopathy, no asymmetry, masses, or scars and thyroid normal to palpation  RESP: lungs clear to auscultation - no rales, rhonchi or wheezes  BREAST: normal without masses, tenderness or nipple discharge and no palpable axillary masses or adenopathy  CV: regular rate " and rhythm, normal S1 S2, no S3 or S4, no murmur, click or rub, no peripheral edema and peripheral pulses strong  ABDOMEN: soft, nontender, no hepatosplenomegaly, no masses and bowel sounds normal   (female): deferred  MS: no gross musculoskeletal defects noted, no edema  SKIN: scattered areas of erythema, maculopapular rash  NEURO: Normal strength and tone, mentation intact and speech normal  PSYCH: mentation appears normal, affect normal/bright        ASSESSMENT/PLAN:   Renetta was seen today for physical.    Diagnoses and all orders for this visit:    Encounter for routine history and physical examination of adult   Routine health maintenance discussion:  No smoking, limited alcohol (7 or less servings per week), 5 fruits/veg servings per day, 200 minutes of exercise per week.  Daily calcium/vitamin D guidelines, bone health, colon cancer screening beginning at age 50.  Accident avoidance, sun screen.    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 She will continue to avoid exacerbants    Vitamin B12 deficiency (non anemic)  -     Vitamin B12; Future  -     Magnesium; Future   Will update labs    Vitamin D deficiency  -     Vitamin D deficiency screening; Future    Lipid screening  -     Lipid panel reflex to direct LDL Fasting; Future    Screening for diabetes mellitus  -     Hemoglobin A1c; Future  -     Comprehensive metabolic panel (BMP + Alb, Alk Phos, ALT, AST, Total. Bili, TP); Future    Other orders  -     REVIEW OF HEALTH MAINTENANCE PROTOCOL ORDERS        Patient has been advised of split billing requirements and indicates understanding: Yes    COUNSELING:  Reviewed preventive health counseling, as reflected in patient instructions       Regular exercise       Healthy diet/nutrition        "Osteoporosis prevention/bone health       Colorectal Cancer Screening    Estimated body mass index is 28.32 kg/m  as calculated from the following:    Height as of this encounter: 1.626 m (5' 4\").    Weight as of this encounter: 74.8 kg (165 lb).    Weight management plan: Discussed healthy diet and exercise guidelines    She reports that she has never smoked. She has never used smokeless tobacco.      Counseling Resources:  ATP IV Guidelines  Pooled Cohorts Equation Calculator  Breast Cancer Risk Calculator  BRCA-Related Cancer Risk Assessment: FHS-7 Tool  FRAX Risk Assessment  ICSI Preventive Guidelines  Dietary Guidelines for Americans, 2010  USDA's MyPlate  ASA Prophylaxis  Lung CA Screening    Nancy Mata MD  [facility]    "

## 2022-05-03 NOTE — PATIENT INSTRUCTIONS
I sent in Methylprednisolone for you, please confirm with the pharmacy it does not have propylene glycol in this.     You can also take Claritin two times daily. This is a safe dose.

## 2022-05-12 ENCOUNTER — LAB (OUTPATIENT)
Dept: LAB | Facility: CLINIC | Age: 52
End: 2022-05-12
Payer: COMMERCIAL

## 2022-05-12 DIAGNOSIS — Z13.220 LIPID SCREENING: ICD-10-CM

## 2022-05-12 DIAGNOSIS — E55.9 VITAMIN D DEFICIENCY: ICD-10-CM

## 2022-05-12 DIAGNOSIS — Z13.1 SCREENING FOR DIABETES MELLITUS: ICD-10-CM

## 2022-05-12 DIAGNOSIS — E53.8 VITAMIN B12 DEFICIENCY (NON ANEMIC): ICD-10-CM

## 2022-05-12 LAB
ALBUMIN SERPL-MCNC: 3.4 G/DL (ref 3.5–5)
ALP SERPL-CCNC: 109 U/L (ref 45–120)
ALT SERPL W P-5'-P-CCNC: 11 U/L (ref 0–45)
ANION GAP SERPL CALCULATED.3IONS-SCNC: 12 MMOL/L (ref 5–18)
AST SERPL W P-5'-P-CCNC: 13 U/L (ref 0–40)
BILIRUB SERPL-MCNC: 0.3 MG/DL (ref 0–1)
BUN SERPL-MCNC: 13 MG/DL (ref 8–22)
CALCIUM SERPL-MCNC: 8.7 MG/DL (ref 8.5–10.5)
CHLORIDE BLD-SCNC: 103 MMOL/L (ref 98–107)
CHOLEST SERPL-MCNC: 140 MG/DL
CO2 SERPL-SCNC: 24 MMOL/L (ref 22–31)
CREAT SERPL-MCNC: 0.66 MG/DL (ref 0.6–1.1)
FASTING STATUS PATIENT QL REPORTED: YES
GFR SERPL CREATININE-BSD FRML MDRD: >90 ML/MIN/1.73M2
GLUCOSE BLD-MCNC: 85 MG/DL (ref 70–125)
HBA1C MFR BLD: 5.5 % (ref 0–5.6)
HDLC SERPL-MCNC: 49 MG/DL
LDLC SERPL CALC-MCNC: 72 MG/DL
MAGNESIUM SERPL-MCNC: 2.2 MG/DL (ref 1.8–2.6)
POTASSIUM BLD-SCNC: 4 MMOL/L (ref 3.5–5)
PROT SERPL-MCNC: 6.9 G/DL (ref 6–8)
SODIUM SERPL-SCNC: 139 MMOL/L (ref 136–145)
TRIGL SERPL-MCNC: 95 MG/DL
VIT B12 SERPL-MCNC: 228 PG/ML (ref 213–816)

## 2022-05-12 PROCEDURE — 36415 COLL VENOUS BLD VENIPUNCTURE: CPT

## 2022-05-12 PROCEDURE — 80053 COMPREHEN METABOLIC PANEL: CPT

## 2022-05-12 PROCEDURE — 82306 VITAMIN D 25 HYDROXY: CPT

## 2022-05-12 PROCEDURE — 80061 LIPID PANEL: CPT

## 2022-05-12 PROCEDURE — 83036 HEMOGLOBIN GLYCOSYLATED A1C: CPT

## 2022-05-12 PROCEDURE — 82607 VITAMIN B-12: CPT

## 2022-05-12 PROCEDURE — 83735 ASSAY OF MAGNESIUM: CPT

## 2022-05-13 LAB — DEPRECATED CALCIDIOL+CALCIFEROL SERPL-MC: 27 UG/L (ref 20–75)

## 2022-05-18 ENCOUNTER — OFFICE VISIT (OUTPATIENT)
Dept: FAMILY MEDICINE | Facility: CLINIC | Age: 52
End: 2022-05-18
Payer: COMMERCIAL

## 2022-05-18 VITALS
OXYGEN SATURATION: 100 % | WEIGHT: 165 LBS | HEIGHT: 64 IN | HEART RATE: 91 BPM | BODY MASS INDEX: 28.17 KG/M2 | DIASTOLIC BLOOD PRESSURE: 72 MMHG | TEMPERATURE: 99 F | RESPIRATION RATE: 16 BRPM | SYSTOLIC BLOOD PRESSURE: 120 MMHG

## 2022-05-18 DIAGNOSIS — R11.0 NAUSEA: Primary | ICD-10-CM

## 2022-05-18 DIAGNOSIS — L25.9 CONTACT DERMATITIS, UNSPECIFIED CONTACT DERMATITIS TYPE, UNSPECIFIED TRIGGER: ICD-10-CM

## 2022-05-18 DIAGNOSIS — R68.83 CHILLS: ICD-10-CM

## 2022-05-18 DIAGNOSIS — E53.8 VITAMIN B12 DEFICIENCY (NON ANEMIC): ICD-10-CM

## 2022-05-18 LAB
ANION GAP SERPL CALCULATED.3IONS-SCNC: 8 MMOL/L (ref 5–18)
BUN SERPL-MCNC: 17 MG/DL (ref 8–22)
CALCIUM SERPL-MCNC: 9.1 MG/DL (ref 8.5–10.5)
CHLORIDE BLD-SCNC: 104 MMOL/L (ref 98–107)
CO2 SERPL-SCNC: 24 MMOL/L (ref 22–31)
CREAT SERPL-MCNC: 0.7 MG/DL (ref 0.6–1.1)
ERYTHROCYTE [DISTWIDTH] IN BLOOD BY AUTOMATED COUNT: 18.6 % (ref 10–15)
GFR SERPL CREATININE-BSD FRML MDRD: >90 ML/MIN/1.73M2
GLUCOSE BLD-MCNC: 111 MG/DL (ref 70–125)
HCT VFR BLD AUTO: 27.5 % (ref 35–47)
HGB BLD-MCNC: 7.7 G/DL (ref 11.7–15.7)
IRON SATN MFR SERPL: 3 % (ref 15–46)
IRON SERPL-MCNC: 14 UG/DL (ref 35–180)
MCH RBC QN AUTO: 18.6 PG (ref 26.5–33)
MCHC RBC AUTO-ENTMCNC: 28 G/DL (ref 31.5–36.5)
MCV RBC AUTO: 66 FL (ref 78–100)
PLATELET # BLD AUTO: 237 10E3/UL (ref 150–450)
POTASSIUM BLD-SCNC: 3.7 MMOL/L (ref 3.5–5)
RBC # BLD AUTO: 4.15 10E6/UL (ref 3.8–5.2)
SODIUM SERPL-SCNC: 136 MMOL/L (ref 136–145)
TIBC SERPL-MCNC: 418 UG/DL (ref 240–430)
TSH SERPL DL<=0.005 MIU/L-ACNC: 2.43 UIU/ML (ref 0.3–5)
WBC # BLD AUTO: 6.8 10E3/UL (ref 4–11)

## 2022-05-18 PROCEDURE — 36415 COLL VENOUS BLD VENIPUNCTURE: CPT | Performed by: PHYSICIAN ASSISTANT

## 2022-05-18 PROCEDURE — 83550 IRON BINDING TEST: CPT | Performed by: PHYSICIAN ASSISTANT

## 2022-05-18 PROCEDURE — 99214 OFFICE O/P EST MOD 30 MIN: CPT | Performed by: PHYSICIAN ASSISTANT

## 2022-05-18 PROCEDURE — 84443 ASSAY THYROID STIM HORMONE: CPT | Performed by: PHYSICIAN ASSISTANT

## 2022-05-18 PROCEDURE — 85027 COMPLETE CBC AUTOMATED: CPT | Performed by: PHYSICIAN ASSISTANT

## 2022-05-18 PROCEDURE — 80048 BASIC METABOLIC PNL TOTAL CA: CPT | Performed by: PHYSICIAN ASSISTANT

## 2022-05-18 PROCEDURE — U0005 INFEC AGEN DETEC AMPLI PROBE: HCPCS | Performed by: PHYSICIAN ASSISTANT

## 2022-05-18 PROCEDURE — U0003 INFECTIOUS AGENT DETECTION BY NUCLEIC ACID (DNA OR RNA); SEVERE ACUTE RESPIRATORY SYNDROME CORONAVIRUS 2 (SARS-COV-2) (CORONAVIRUS DISEASE [COVID-19]), AMPLIFIED PROBE TECHNIQUE, MAKING USE OF HIGH THROUGHPUT TECHNOLOGIES AS DESCRIBED BY CMS-2020-01-R: HCPCS | Performed by: PHYSICIAN ASSISTANT

## 2022-05-18 RX ORDER — BETAMETHASONE VALERATE 0.1 %
LOTION (ML) TOPICAL 2 TIMES DAILY
Qty: 60 ML | Refills: 1 | Status: SHIPPED | OUTPATIENT
Start: 2022-05-18 | End: 2023-06-13

## 2022-05-18 ASSESSMENT — ENCOUNTER SYMPTOMS
DIARRHEA: 0
BLOOD IN STOOL: 0
EYE PAIN: 0
DIZZINESS: 0
EYE DISCHARGE: 0
ABDOMINAL PAIN: 0
CHILLS: 1
LIGHT-HEADEDNESS: 0
DIAPHORESIS: 0
WHEEZING: 0
RHINORRHEA: 0
ACTIVITY CHANGE: 0
NAUSEA: 1
SORE THROAT: 0
VOMITING: 0
FATIGUE: 0
EYE ITCHING: 0
FEVER: 0
EYE REDNESS: 0
HEADACHES: 0
SINUS PRESSURE: 0
SINUS PAIN: 0
CONSTIPATION: 1
COUGH: 0
SHORTNESS OF BREATH: 0

## 2022-05-18 ASSESSMENT — PAIN SCALES - GENERAL: PAINLEVEL: NO PAIN (0)

## 2022-05-18 NOTE — PROGRESS NOTES
Progress Note  5/18/22     Chief Complaint   Patient presents with     Follow Up     PT WAS TREATED FOR SKIN RASH, FINISHED MEDIATION 1 WEEK AGO,  MEDICATION  MADE PT FEEL NAUSEA AND CHILLS,           HPI    Renetta Joyner is a pleasant  52 year old year old female  who present to the clinic today for evaluation of nausea and chills.  She started taking a steroid Dosepak on May 3 for a contact dermatitis on her arms and her legs.  She finished the 6 days of it and then 2 days later developed some nausea and some chills.  She states she had the nausea x1 day and this is improved but she continues to have low-grade fevers of 99 is her highest and some chills.  She has been taking ibuprofen which has been helping.  She has not been having any other symptoms including upper respiratory symptoms no chest pain shortness of breath.  She has developed a new sore on the right side of her tongue yesterday and this is painful when she is eating.  She is not having any other sore throat.  No lymphadenopathy.  She is also complaining of constipation but although this is chronic in nature.  The steroid pack did not really do much for her rash.  Looking at her rash today on her arms and her legs I do not suspect that there is any infection or cellulitis that would be contributing to her symptoms.  She does have a history to propylene glycol and she used a hair dye which contained this and she thinks this has flared up her rash this time.  She does follow with dermatology.    ROS    Review of Systems   Constitutional: Positive for chills. Negative for activity change, diaphoresis, fatigue and fever.   HENT: Negative for congestion, ear discharge, ear pain, postnasal drip, rhinorrhea, sinus pressure, sinus pain and sore throat.    Eyes: Negative for pain, discharge, redness and itching.   Respiratory: Negative for cough, shortness of breath and wheezing.    Cardiovascular: Negative for chest pain.   Gastrointestinal: Positive for  constipation and nausea. Negative for abdominal pain, blood in stool, diarrhea and vomiting.   Skin: Positive for rash.        Contact dermatitis to arms and shin.   Neurological: Negative for dizziness, light-headedness and headaches.            Patient Active Problem List   Diagnosis     Other fatigue     Glossodynia     Allergic rhinitis     Iron deficiency anemia secondary to inadequate dietary iron intake     Carpal tunnel syndrome     Ganglion     Menorrhagia     Ovarian cyst     Vitamin D deficiency     Foot pain, right        Past Medical History:   Diagnosis Date     Abdominal pain, left lower quadrant      Cough      History of pregnancy      - One  5/15/00, then 3 C-sections     Impetigo      Iron deficiency anemia secondary to inadequate dietary iron intake      Nonspecific abnormal results of thyroid function study      Other disorder of menstruation and other abnormal bleeding from female genital tract      Unspecified vitamin D deficiency     resolved          Social History     Socioeconomic History     Marital status:      Spouse name: Not on file     Number of children: 4     Years of education: Not on file     Highest education level: Not on file   Occupational History     Not on file   Tobacco Use     Smoking status: Never Smoker     Smokeless tobacco: Never Used   Substance and Sexual Activity     Alcohol use: No     Drug use: No     Sexual activity: Yes     Partners: Male     Birth control/protection: Surgical     Comment: Tubal ligation   Other Topics Concern     Not on file   Social History Narrative    Immigrant first-generation. Originally from [place].   with 4 children - daughter Janice 5/15/00, son aMrley 02, daughter Adam 06, and daughter Carrie 09.   and children's last name is Mike.     Social Determinants of Health     Financial Resource Strain: Not on file   Food Insecurity: Not on file   Transportation Needs: Not on file  "  Physical Activity: Not on file   Stress: Not on file   Social Connections: Not on file   Intimate Partner Violence: Not on file   Housing Stability: Not on file           Allergies   Allergen Reactions     Carbamates Itching     Cat Dander [Animal Dander] Cough     Propylene Glycol Itching          Current Outpatient Medications   Medication     betamethasone valerate (VALISONE) 0.1 % external lotion     loratadine (CLARITIN) 10 mg tablet     methylPREDNISolone (MEDROL DOSEPAK) 4 MG tablet therapy pack     No current facility-administered medications for this visit.            /72   Pulse 91   Temp 99  F (37.2  C)   Resp 16   Ht 1.626 m (5' 4\")   Wt 74.8 kg (165 lb)   LMP 04/27/2022 (Exact Date)   SpO2 100%   Breastfeeding No   BMI 28.32 kg/m            Physical Exam:     Physical Exam  Vitals and nursing note reviewed.   Constitutional:       General: She is awake. She is not in acute distress.     Appearance: Normal appearance. She is well-groomed. She is not ill-appearing, toxic-appearing or diaphoretic.   HENT:      Head: Normocephalic and atraumatic.      Mouth/Throat:      Mouth: Mucous membranes are moist.      Dentition: No dental tenderness.      Tongue: Lesions present.      Palate: No lesions.      Pharynx: Oropharynx is clear. No pharyngeal swelling, oropharyngeal exudate, posterior oropharyngeal erythema or uvula swelling.      Tonsils: No tonsillar exudate or tonsillar abscesses. 0 on the right. 0 on the left.      Comments: 5 mm x 5 mm ulceration to the right side of tongue  Neck:      Trachea: Trachea normal.   Cardiovascular:      Rate and Rhythm: Normal rate and regular rhythm.      Heart sounds: S1 normal and S2 normal. No murmur heard.    No friction rub.   Pulmonary:      Effort: Pulmonary effort is normal.      Breath sounds: Normal breath sounds.   Musculoskeletal:      Cervical back: Full passive range of motion without pain.      Left lower leg: No edema.   Lymphadenopathy: "      Cervical: No cervical adenopathy.   Skin:     General: Skin is warm and dry.      Findings: Rash present.      Comments: Contact dermatitis to arms and lower extremities.  No evidence of infection   Neurological:      General: No focal deficit present.      Mental Status: She is alert.      GCS: GCS eye subscore is 4. GCS verbal subscore is 5. GCS motor subscore is 6.      Gait: Gait is intact.   Psychiatric:         Behavior: Behavior is cooperative.              Assessment/Plan:       ICD-10-CM    1. Nausea  R11.0 CBC with platelets     Basic metabolic panel     TSH     Symptomatic; Unknown COVID-19 Virus (Coronavirus) by PCR Nose     CBC with platelets     Basic metabolic panel     TSH   2. Chills  R68.83 CBC with platelets     Basic metabolic panel     TSH     Symptomatic; Unknown COVID-19 Virus (Coronavirus) by PCR Nose     CBC with platelets     Basic metabolic panel     TSH   3. Contact dermatitis, unspecified contact dermatitis type, unspecified trigger  L25.9 betamethasone valerate (VALISONE) 0.1 % external lotion     #1 nausea- resolved  #2 chills  #3 contact dermatitis  Etiology unclear.  He has been having chills for about a week since she discontinued the steroid Dosepak for her contact dermatitis.  She was having some mild nausea but this has improved.  She has developed a sores on the left side of her tongue which is about 5 mm x 5 mm which is painful.  Does not appear to be bleeding.  Also been having chronic constipation.  No other upper respiratory symptoms.  No cough.  No chest pains or shortness of breath.  She also does have a her rash which has not improved with the steroid pack.  This is thought to be due to a hair dye that she use that contain propylene glycol.  No other sick contacts.  We will do a COVID test to rule this out as a contributing factor.  We will also check a CBC BMP and check a thyroid as well.  Encouraged salt water rinses 3-4 times a day for the next week.  I will send  her a topical steroid to see if this helps with the contact dermatitis. consent sore in her mouth, rash, and some constipation it would be reasonable to check some rheumatoid studies if this does not improve.  She did have an DEANNA and a rheumatoid factor June 2021 which were negative at the time.  Continue ibuprofen as this has been helping.  Continue to monitor for new or worsening symptoms.  If her symptoms do not improve for a week I did recommend following up so we can do some further evaluation.  She is to follow-up sooner if symptoms worsen.  Patient agreed to this plan.  Questions were answered        Juan Carlos Jimenez PA-C

## 2022-05-19 LAB — SARS-COV-2 RNA RESP QL NAA+PROBE: NEGATIVE

## 2022-07-29 ENCOUNTER — TRANSFERRED RECORDS (OUTPATIENT)
Dept: HEALTH INFORMATION MANAGEMENT | Facility: CLINIC | Age: 52
End: 2022-07-29

## 2022-08-24 ENCOUNTER — MYC MEDICAL ADVICE (OUTPATIENT)
Dept: FAMILY MEDICINE | Facility: CLINIC | Age: 52
End: 2022-08-24

## 2022-08-24 NOTE — TELEPHONE ENCOUNTER
Medical message sent to patient. Additional questions sent.  Dede Arias RN on 8/24/2022 at 3:07 PM

## 2022-09-06 ENCOUNTER — MYC MEDICAL ADVICE (OUTPATIENT)
Dept: FAMILY MEDICINE | Facility: CLINIC | Age: 52
End: 2022-09-06

## 2022-09-08 ENCOUNTER — NURSE TRIAGE (OUTPATIENT)
Dept: NURSING | Facility: CLINIC | Age: 52
End: 2022-09-08

## 2022-09-08 NOTE — TELEPHONE ENCOUNTER
Nurse Triage SBAR    Situation:  Symptoms started Sunday evening and she tested positive for COVID  09/06/2022.  Her  and all 6 kids are all positive after [place]    Background: Patient,soila Jackson. Consent: not needed.    Assessment:  Sore throat, body aches, runny nose, watery eyes. Temp  at most. She denies cough, chest pain, difficulty breathing.     Protocol Recommended Disposition: Home care/ Telephone Advise    Recommendation: According to the protocol, Patient should do home care. Home care reviewed. Advised Patient to do home care. Home care reviewed. Care advice given. Patient verbalizes understanding and agrees with plan of care. Reviewed concerning symptoms and when to call back. She verbalized understanding and agreed to care plan.      Doris River RN  [facility] Nurse Advisor  9/8/2022 at 10:42 AM        Reason for Disposition    [1] COVID-19 diagnosed by positive lab test (e.g., PCR, rapid self-test kit) AND [2] mild symptoms (e.g., cough, fever, others) AND [3] no complications or SOB    Additional Information    Negative: SEVERE difficulty breathing (e.g., struggling for each breath, speaks in single words)    Negative: Difficult to awaken or acting confused (e.g., disoriented, slurred speech)    Negative: Bluish (or gray) lips or face now    Negative: Shock suspected (e.g., cold/pale/clammy skin, too weak to stand, low BP, rapid pulse)    Negative: Sounds like a life-threatening emergency to the triager    Negative: [1] Diagnosed or suspected COVID-19 AND [2] symptoms lasting 3 or more weeks    Negative: [1] COVID-19 exposure AND [2] no symptoms    Negative: COVID-19 vaccine reaction suspected (e.g., fever, headache, muscle aches) occurring 1 to 3 days after getting vaccine    Negative: COVID-19 vaccine, questions about    Negative: [1] Lives with someone known to have influenza (flu test positive) AND [2] flu-like symptoms  (e.g., cough, runny nose, sore throat, SOB; with or without fever)    Negative: [1] Adult with possible COVID-19 symptoms AND [2] triager concerned about severity of symptoms or other causes    Negative: COVID-19 and breastfeeding, questions about    Negative: SEVERE or constant chest pain or pressure  (Exception: Mild central chest pain, present only when coughing.)    Negative: MODERATE difficulty breathing (e.g., speaks in phrases, SOB even at rest, pulse 100-120)    Negative: Headache and stiff neck (can't touch chin to chest)    Negative: Oxygen level (e.g., pulse oximetry) 90 percent or lower    Negative: Chest pain or pressure  (Exception: MILD central chest pain, present only when coughing)    Negative: Patient sounds very sick or weak to the triager    Negative: MILD difficulty breathing (e.g., minimal/no SOB at rest, SOB with walking, pulse <100)    Negative: Fever > 103 F (39.4 C)    Negative: [1] Fever > 101 F (38.3 C) AND [2] over 60 years of age    Negative: [1] Fever > 100.0 F (37.8 C) AND [2] bedridden (e.g., nursing home patient, CVA, chronic illness, recovering from surgery)    Negative: [1] HIGH RISK for severe COVID complications (e.g., weak immune system, age > 64 years, obesity with BMI of 30 or higher, pregnant, chronic lung disease or other chronic medical condition) AND [2] COVID symptoms (e.g., cough, fever)  (Exceptions: Already seen by PCP and no new or worsening symptoms.)    Negative: [1] HIGH RISK patient AND [2] influenza is widespread in the community AND [3] ONE OR MORE respiratory symptoms: cough, sore throat, runny or stuffy nose    Negative: [1] HIGH RISK patient AND [2] influenza exposure within the last 7 days AND [3] ONE OR MORE respiratory symptoms: cough, sore throat, runny or stuffy nose    Negative: Oxygen level (e.g., pulse oximetry) 91 to 94 percent    Negative: [1] COVID-19 infection suspected by caller or triager AND [2] mild symptoms (cough, fever, or others) AND [3]  negative COVID-19 rapid test    Negative: Fever present > 3 days (72 hours)    Negative: [1] Fever returns after gone for over 24 hours AND [2] symptoms worse or not improved    Negative: [1] Continuous (nonstop) coughing interferes with work or school AND [2] no improvement using cough treatment per Care Advice    Negative: Cough present > 3 weeks    Negative: [1] COVID-19 diagnosed by positive lab test (e.g., PCR, rapid self-test kit) AND [2] NO symptoms (e.g., cough, fever, others)    Protocols used: CORONAVIRUS (COVID-19) DIAGNOSED OR [id number]

## 2022-09-09 ENCOUNTER — NURSE TRIAGE (OUTPATIENT)
Dept: NURSING | Facility: CLINIC | Age: 52
End: 2022-09-09

## 2022-09-09 NOTE — TELEPHONE ENCOUNTER
"Coronavirus (COVID-19) Notification    Caller Name (Patient, parent, daughter/son, grandparent, etc)  Renetta Joyner/Self    Reason for call  Notify of Positive Coronavirus (COVID-19) lab results, assess symptoms,  review [facility] recommendations    Lab Result    Lab test:  2019-nCoV rRt-PCR or SARS-CoV-2 PCR    Oropharyngeal AND/OR nasopharyngeal swabs is POSITIVE for 2019-nCoV RNA/SARS-COV-2 PCR (COVID-19 virus)    Brief introduction script  Introduce self then review script:  \"I am calling on behalf of [email or web address].  We were notified that your Coronavirus test (COVID-19) for was POSITIVE for the virus.\"    Gather patient reported symptoms   Assessment   Current Symptoms at time of phone call, reported by patient: (if no symptoms, document No symptoms] Fever, sore throat, cough, body aches, runny nose and watery eyes   Date of Symptom(s) onset (if applicable) Five days ago     If at time of call, Patients symptoms hare worsened, the Patient should contact 911 or have someone drive them to Emergency Dept promptly:      If Patient calling 911, inform 911 personal that you have tested positive for the Coronavirus (COVID-19).  Place mask on and await 911 to arrive.    If Emergency Dept, If possible, please have another adult drive you to the Emergency Dept but you need to wear mask when in contact with other people.      Monoclonal Antibody Administration    You may be eligible to receive a new treatment with a monoclonal antibody for preventing hospitalization in patients at high risk for complications from COVID-19. This medication is still experimental and available on a limited basis; it is given through an IV and must be given at an infusion center. Please note that not all people who are eligible will receive the medication since it is in limited supply.  Is the patient symptomatic and onset of symptoms within the last 7 days?  Yes  Is the patient interested in a visit with a provider to discuss " treatment options?: Yes  Is the patient seen at [facility]?  No: Warm transfer caller to [phone] to be scheduled with a virtual urgent provider.  During transfer, instruct  on appropriate time frame for visit     Review information with Patient    Your result was positive. This means you have COVID-19 (coronavirus).      How can I protect others?    These guidelines are for isolating before returning to work, school or .       If you DO have symptoms:  o Stay home and away from others  - For at least 5 days after your symptoms started, AND   - You are fever free for 24 hours (with no medicine that reduces fever), AND  - Your other symptoms are better.  o Wear a mask for 10 full days any time you are around others.    If you DON'T have symptoms:  o Stay at home and away from others for at least 5 days after your positive test.  o Wear a mask for 10 full days any time you are around others.    There may be different guidelines for healthcare facilities. Please check with the specific sites before arriving.     If you've been told by a doctor that you were severely ill with COVID-19 or are immunocompromised, you should isolate for at least 10 days.    You should not go back to work until you meet the guidelines above for ending your home isolation. You don't need to be retested for COVID-19 before going back to work--studies show that you won't spread the virus if it's been at least 10 days since your symptoms started (or 20 days, if you have a weak immune system).    Employers, schools, and daycares: This is an official notice for this person's medical guidelines for returning in-person. They must meet the above guidelines before going back to work, school, or  in person.    You will receive a positive COVID-19 letter via [employer] or the mail soon with additional self-care information.      Would you like me to review some of that information with you now?  Yes    How can I take  care of myself?      Get lots of rest. Drink extra fluids (unless a doctor has told you not to).      Take Tylenol (acetaminophen) for fever or pain. If you have liver or kidney problems, ask your family doctor if it's okay to take Tylenol.     Take either:     650 mg (two 325 mg pills) every 4 to 6 hours, or     1,000 mg (two 500 mg pills) every 8 hours as needed.     Note: Do not take more than 3,000 mg in one day. Acetaminophen is found in many medicines (both prescribed and over-the-counter medicines). Read all labels to be sure you don't take too much.    For children, check the Tylenol bottle for the right dose (based on their age or weight).      If you have other health problems (like cancer, heart failure, an organ transplant or severe kidney disease): Call your specialty clinic if you don't feel better in the next 2 days.      Know when to call 911: Emergency warning signs include:    Trouble breathing or shortness of breath    Pain or pressure in the chest that doesn't go away    Feeling confused like you haven't felt before, or not being able to wake up    Bluish-colored lips or face        If you were tested for an upcoming procedure, please contact your provider for next steps.     Winifred Parsons RN    Reason for Disposition    [1] COVID-19 diagnosed by positive lab test (e.g., PCR, rapid self-test kit) AND [2] mild symptoms (e.g., cough, fever, others) AND [3] no complications or SOB    Additional Information    Negative: SEVERE difficulty breathing (e.g., struggling for each breath, speaks in single words)    Negative: Difficult to awaken or acting confused (e.g., disoriented, slurred speech)    Negative: Bluish (or gray) lips or face now    Negative: Shock suspected (e.g., cold/pale/clammy skin, too weak to stand, low BP, rapid pulse)    Negative: Sounds like a life-threatening emergency to the triager    Negative: [1] Diagnosed or suspected COVID-19 AND [2] symptoms lasting 3 or more  weeks    Negative: [1] COVID-19 exposure AND [2] no symptoms    Negative: COVID-19 vaccine reaction suspected (e.g., fever, headache, muscle aches) occurring 1 to 3 days after getting vaccine    Negative: COVID-19 vaccine, questions about    Negative: [1] Lives with someone known to have influenza (flu test positive) AND [2] flu-like symptoms (e.g., cough, runny nose, sore throat, SOB; with or without fever)    Negative: [1] Adult with possible COVID-19 symptoms AND [2] triager concerned about severity of symptoms or other causes    Negative: COVID-19 and breastfeeding, questions about    Negative: SEVERE or constant chest pain or pressure  (Exception: Mild central chest pain, present only when coughing.)    Negative: MODERATE difficulty breathing (e.g., speaks in phrases, SOB even at rest, pulse 100-120)    Negative: Headache and stiff neck (can't touch chin to chest)    Negative: Oxygen level (e.g., pulse oximetry) 90 percent or lower    Negative: Chest pain or pressure  (Exception: MILD central chest pain, present only when coughing)    Negative: Patient sounds very sick or weak to the triager    Negative: MILD difficulty breathing (e.g., minimal/no SOB at rest, SOB with walking, pulse <100)    Negative: Fever > 103 F (39.4 C)    Negative: [1] Fever > 101 F (38.3 C) AND [2] over 60 years of age    Negative: [1] Fever > 100.0 F (37.8 C) AND [2] bedridden (e.g., nursing home patient, CVA, chronic illness, recovering from surgery)    Negative: [1] HIGH RISK for severe COVID complications (e.g., weak immune system, age > 64 years, obesity with BMI of 30 or higher, pregnant, chronic lung disease or other chronic medical condition) AND [2] COVID symptoms (e.g., cough, fever)  (Exceptions: Already seen by PCP and no new or worsening symptoms.)    Negative: [1] HIGH RISK patient AND [2] influenza is widespread in the community AND [3] ONE OR MORE respiratory symptoms: cough, sore throat, runny or stuffy nose    Negative:  [1] HIGH RISK patient AND [2] influenza exposure within the last 7 days AND [3] ONE OR MORE respiratory symptoms: cough, sore throat, runny or stuffy nose    Negative: Oxygen level (e.g., pulse oximetry) 91 to 94 percent    Negative: [1] COVID-19 infection suspected by caller or triager AND [2] mild symptoms (cough, fever, or others) AND [3] negative COVID-19 rapid test    Negative: Fever present > 3 days (72 hours)    Negative: [1] Fever returns after gone for over 24 hours AND [2] symptoms worse or not improved    Negative: [1] Continuous (nonstop) coughing interferes with work or school AND [2] no improvement using cough treatment per Care Advice    Negative: Cough present > 3 weeks    Negative: [1] COVID-19 diagnosed by positive lab test (e.g., PCR, rapid self-test kit) AND [2] NO symptoms (e.g., cough, fever, others)    Protocols used: CORONAVIRUS (COVID-19) DIAGNOSED OR [id number]

## 2022-09-23 ENCOUNTER — TRANSFERRED RECORDS (OUTPATIENT)
Dept: HEALTH INFORMATION MANAGEMENT | Facility: CLINIC | Age: 52
End: 2022-09-23

## 2022-10-01 ENCOUNTER — HEALTH MAINTENANCE LETTER (OUTPATIENT)
Age: 52
End: 2022-10-01

## 2022-10-17 ENCOUNTER — HOSPITAL ENCOUNTER (OUTPATIENT)
Dept: MAMMOGRAPHY | Facility: CLINIC | Age: 52
Discharge: HOME | End: 2022-10-17
Attending: FAMILY MEDICINE | Admitting: FAMILY MEDICINE
Payer: COMMERCIAL

## 2022-10-17 DIAGNOSIS — Z12.31 VISIT FOR SCREENING MAMMOGRAM: ICD-10-CM

## 2022-10-17 PROCEDURE — 77067 SCR MAMMO BI INCL CAD: CPT

## 2023-04-20 ENCOUNTER — PATIENT OUTREACH (OUTPATIENT)
Dept: CARE COORDINATION | Facility: CLINIC | Age: 53
End: 2023-04-20
Payer: COMMERCIAL

## 2023-06-13 ENCOUNTER — OFFICE VISIT (OUTPATIENT)
Dept: INTERNAL MEDICINE | Facility: CLINIC | Age: 53
End: 2023-06-13
Payer: COMMERCIAL

## 2023-06-13 VITALS
HEIGHT: 64 IN | OXYGEN SATURATION: 96 % | BODY MASS INDEX: 29.37 KG/M2 | TEMPERATURE: 98.3 F | SYSTOLIC BLOOD PRESSURE: 108 MMHG | RESPIRATION RATE: 16 BRPM | HEART RATE: 60 BPM | DIASTOLIC BLOOD PRESSURE: 68 MMHG | WEIGHT: 172 LBS

## 2023-06-13 DIAGNOSIS — H25.012 CORTICAL AGE-RELATED CATARACT OF LEFT EYE: ICD-10-CM

## 2023-06-13 DIAGNOSIS — D50.0 IRON DEFICIENCY ANEMIA DUE TO CHRONIC BLOOD LOSS: ICD-10-CM

## 2023-06-13 DIAGNOSIS — Z01.818 PREOP GENERAL PHYSICAL EXAM: Primary | ICD-10-CM

## 2023-06-13 LAB
ERYTHROCYTE [DISTWIDTH] IN BLOOD BY AUTOMATED COUNT: 11.7 % (ref 10–15)
FERRITIN SERPL-MCNC: 21 NG/ML (ref 11–328)
HCT VFR BLD AUTO: 41.3 % (ref 35–47)
HGB BLD-MCNC: 14 G/DL (ref 11.7–15.7)
MCH RBC QN AUTO: 31.3 PG (ref 26.5–33)
MCHC RBC AUTO-ENTMCNC: 33.9 G/DL (ref 31.5–36.5)
MCV RBC AUTO: 92 FL (ref 78–100)
PLATELET # BLD AUTO: 262 10E3/UL (ref 150–450)
RBC # BLD AUTO: 4.47 10E6/UL (ref 3.8–5.2)
WBC # BLD AUTO: 7.5 10E3/UL (ref 4–11)

## 2023-06-13 PROCEDURE — 85027 COMPLETE CBC AUTOMATED: CPT | Performed by: INTERNAL MEDICINE

## 2023-06-13 PROCEDURE — 36415 COLL VENOUS BLD VENIPUNCTURE: CPT | Performed by: INTERNAL MEDICINE

## 2023-06-13 PROCEDURE — 82728 ASSAY OF FERRITIN: CPT | Performed by: INTERNAL MEDICINE

## 2023-06-13 PROCEDURE — 99214 OFFICE O/P EST MOD 30 MIN: CPT | Performed by: INTERNAL MEDICINE

## 2023-06-13 NOTE — PATIENT INSTRUCTIONS
Satisfactory preoperative medical examination in anticipation of Left side cataract surgery scheduled for June 26, 2023    Ms. Joyner is a historically very healthy 53-year-old lady.  She works as a realtor.  She does have a history of iron deficiency anemia with a hemoglobin of 7.7 measured in May 2022.    Today June 13, 2023, lets have her swing by the laboratory to check her blood cell counts and ferritin level.  Electrocardiogram not needed, no cardiac symptoms.    She is reports feeling well.  No history of any heart problems lung disease, diabetes or kidney disease.    The only medication she takes are Claritin for allergies, Tylenol for aches and pains, and iron.    She has an upcoming general physical exam in August      RECOMMENDATION:  APPROVAL GIVEN to proceed with proposed procedure, without further diagnostic evaluation.

## 2023-06-13 NOTE — PROGRESS NOTES
[facility]  [address] [address]  Phone: [phone]  Fax: [phone]  Primary Provider: Nancy Mata  Pre-op Performing Provider: ROMULO QUEZADA    PREOPERATIVE EVALUATION:  Today's date: 6/13/2023    Renetta Joyner is a 53 year old female who presents for a preoperative evaluation        6/13/2023     3:14 PM   Additional Questions   Roomed by Juju     Surgical Information:  Surgery/Procedure: Left Eye Cataract  Surgery Location: [facility]  Surgeon: Kunal Maher  Surgery Date: 6/26/23  Time of Surgery  Where patient plans to recover: At home with family  Fax number for surgical facility: [phone]    Assessment & Plan     The proposed surgical procedure is considered LOW risk.    Satisfactory preoperative medical examination in anticipation of Left side cataract surgery scheduled for June 26, 2023    Ms. Joyner is a historically very healthy 53-year-old lady.  She works as a realtor.  She does have a history of iron deficiency anemia with a hemoglobin of 7.7 measured in May 2022.    Today June 13, 2023, lets have her swing by the laboratory to check her blood cell counts and ferritin level--CBC shows hemoglobin back to normal at 14 g.  Expect ferritin to come back normal.    Electrocardiogram not needed, no cardiac symptoms.    She is reports feeling well.  No history of any heart problems lung disease, diabetes or kidney disease.    The only medication she takes are Claritin for allergies, Tylenol for aches and pains, and iron.    She has an upcoming general physical exam in August      RECOMMENDATION:  APPROVAL GIVEN to proceed with proposed procedure, without further diagnostic evaluation.    Subjective     HPI related to upcoming procedure:     Cataracts    Iron deficiency anemia, likely from perimenopausal heavy menses            6/13/2023     3:08 PM   Preop Questions   1. Have you ever had a heart attack or  stroke? No   2. Have you ever had surgery on your heart or blood vessels, such as a stent placement, a coronary artery bypass, or surgery on an artery in your head, neck, heart, or legs? No   3. Do you have chest pain with activity? No   4. Do you have a history of  heart failure? No   5. Do you currently have a cold, bronchitis or symptoms of other infection? No   6. Do you have a cough, shortness of breath, or wheezing? No   7. Do you or anyone in your family have previous history of blood clots? No   8. Do you or does anyone in your family have a serious bleeding problem such as prolonged bleeding following surgeries or cuts? No   9. Have you ever had problems with anemia or been told to take iron pills? YES - Fe deficiency   10. Have you had any abnormal blood loss such as black, tarry or bloody stools, or abnormal vaginal bleeding? No   11. Have you ever had a blood transfusion? No   12. Are you willing to have a blood transfusion if it is medically needed before, during, or after your surgery? Yes   13. Have you or any of your relatives ever had problems with anesthesia? No   14. Do you have sleep apnea, excessive snoring or daytime drowsiness? No   15. Do you have any artifical heart valves or other implanted medical devices like a pacemaker, defibrillator, or continuous glucose monitor? No   16. Do you have artificial joints? No   17. Are you allergic to latex? No   18. Is there any chance that you may be pregnant? No       Review of Systems  CONSTITUTIONAL: NEGATIVE for fever, chills, change in weight  ENT/MOUTH: NEGATIVE for ear, mouth and throat problems  RESP: NEGATIVE for significant cough or SOB  CV: NEGATIVE for chest pain, palpitations or peripheral edema    Patient Active Problem List    Diagnosis Date Noted     Other fatigue      Priority: Medium     Created by Conversion      Formatting of this note might be different from the original.  Created by Conversion       Foot pain, right 02/20/2018      Priority: Medium     Allergic rhinitis      Priority: Medium     Created by Conversion  Replacement Utility updated for latest IMO load      Formatting of this note might be different from the original.  Created by Conversion    Replacement Utility updated for latest IMO load       Ganglion      Priority: Medium     Created by Conversion  [facility] Annotation: May 12 2011  6:41PM - Janine Stoll: Left wrist   radial side  Replacement Utility updated for latest IMO load      Formatting of this note might be different from the original.  Created by Conversion  [facility] Annotation: May 12 2011  6:41PM - Janine Stoll: Left wrist   radial side    Replacement Utility updated for latest IMO load       Vitamin D deficiency 2015     Priority: Medium     Ovarian cyst      Priority: Medium     Created by Conversion  [facility] Annotation: 2014 11:46AM Danny Almendarez: Complex cyst   on   US.      Formatting of this note might be different from the original.  Created by Conversion  [facility] Annotation: 2014 11:46AM Danny Almendarez: Complex cyst on   US.       Menorrhagia      Priority: Medium     Created by Conversion      Formatting of this note might be different from the original.  Created by Conversion       Glossodynia      Priority: Medium     Created by Conversion      Formatting of this note might be different from the original.  Created by Conversion       Iron deficiency anemia secondary to inadequate dietary iron intake      Priority: Medium     Created by Conversion      Formatting of this note might be different from the original.  Created by Conversion       Carpal tunnel syndrome      Priority: Medium     Created by Conversion      Formatting of this note might be different from the original.  Created by Conversion        Past Medical History:   Diagnosis Date     Abdominal pain, left lower quadrant      Cough      History of pregnancy      - One  5/15/00, then 3  "C-sections     Impetigo      Iron deficiency anemia secondary to inadequate dietary iron intake      Nonspecific abnormal results of thyroid function study      Other disorder of menstruation and other abnormal bleeding from female genital tract      Unspecified vitamin D deficiency     resolved     Past Surgical History:   Procedure Laterality Date      SECTION  2002    Son Marley      SECTION  2006    Daughter Adam      SECTION  2009    Daughter Carrie.      TUBAL LIGATION  2009    With last      Current Outpatient Medications   Medication Sig Dispense Refill     acetaminophen (TYLENOL) 325 MG tablet Take 1-2 tablets (325-650 mg) by mouth every 6 hours as needed for mild pain 100 tablet 3     loratadine (CLARITIN) 10 mg tablet [LORATADINE (CLARITIN) 10 MG TABLET] Take 10 mg by mouth daily.       betamethasone valerate (VALISONE) 0.1 % external lotion Apply topically 2 times daily (Patient not taking: Reported on 2023) 60 mL 1     methylPREDNISolone (MEDROL DOSEPAK) 4 MG tablet therapy pack Follow Package Directions (Patient not taking: Reported on 2022) 21 tablet 0       Allergies   Allergen Reactions     Carbamates Itching     Cat Dander [Animal Dander] Cough     Propylene Glycol Itching        Social History     Tobacco Use     Smoking status: Never     Smokeless tobacco: Never   Vaping Use     Vaping status: Not on file   Substance Use Topics     Alcohol use: No       History   Drug Use No         Objective     /68 (BP Location: Right arm, Patient Position: Sitting, Cuff Size: Adult Regular)   Pulse 60   Temp 98.3  F (36.8  C)   Resp 16   Ht 1.626 m (5' 4\")   Wt 78 kg (172 lb)   SpO2 96%   BMI 29.52 kg/m      Physical Exam     General: Alert, in no distress  Skin: No significant lesion seen.  Eyes/nose/throat: Eyes without scleral icterus, eye movements normal, pupils equal and reactive, oropharynx clear, ears with normal TM's  MSK: " Neck with good ROM  Lymphatic: Neck without adenopathy or masses  Endocrine: Thyroid with no nodules to palpation  Pulm: Lungs clear to auscultation bilaterally  Cardiac: Heart with regular rate and rhythm, no murmur or gallop  GI: Abdomen soft, nontender.  MSK: Extremities no tenderness or edema  Neuro: Moves all extremities, without focal weakness  Psych: Alert, normal mental status. Normal affect and speech      Recent Labs   Lab Test 05/18/22  1150 05/12/22  0929 03/04/22  1436   HGB 7.7*  --  7.8*     --  306    139  --    POTASSIUM 3.7 4.0  --    CR 0.70 0.66  --    A1C  --  5.5  --         Diagnostics:  Recent Results (from the past 48 hour(s))   CBC with platelets    Collection Time: 06/13/23  3:50 PM   Result Value Ref Range    WBC Count 7.5 4.0 - 11.0 10e3/uL    RBC Count 4.47 3.80 - 5.20 10e6/uL    Hemoglobin 14.0 11.7 - 15.7 g/dL    Hematocrit 41.3 35.0 - 47.0 %    MCV 92 78 - 100 fL    MCH 31.3 26.5 - 33.0 pg    MCHC 33.9 31.5 - 36.5 g/dL    RDW 11.7 10.0 - 15.0 %    Platelet Count 262 150 - 450 10e3/uL        Revised Cardiac Risk Index (RCRI):  The patient has the following serious cardiovascular risks for perioperative complications:   - No serious cardiac risks = 0 points     RCRI Interpretation: 0 points: Class I (very low risk - 0.4% complication rate)      Signed Electronically by: ROMULO QUEZADA MD  Copy of this evaluation report is provided to requesting physician.

## 2023-08-12 ENCOUNTER — HEALTH MAINTENANCE LETTER (OUTPATIENT)
Age: 53
End: 2023-08-12

## 2023-09-06 ENCOUNTER — TELEPHONE (OUTPATIENT)
Dept: FAMILY MEDICINE | Facility: CLINIC | Age: 53
End: 2023-09-06

## 2023-09-06 NOTE — TELEPHONE ENCOUNTER
Called pt. In Re: to the vaccines that were scheduled with no orders and no clarification of which vaccines, Patient need's to get these orders from here PCP and Reschedule.

## 2023-09-18 ENCOUNTER — PATIENT OUTREACH (OUTPATIENT)
Dept: CARE COORDINATION | Facility: CLINIC | Age: 53
End: 2023-09-18
Payer: COMMERCIAL

## 2023-10-16 ENCOUNTER — PATIENT OUTREACH (OUTPATIENT)
Dept: CARE COORDINATION | Facility: CLINIC | Age: 53
End: 2023-10-16
Payer: COMMERCIAL

## 2023-12-07 ENCOUNTER — HOSPITAL ENCOUNTER (OUTPATIENT)
Dept: MAMMOGRAPHY | Facility: CLINIC | Age: 53
Discharge: HOME | End: 2023-12-07
Attending: FAMILY MEDICINE | Admitting: FAMILY MEDICINE
Payer: COMMERCIAL

## 2023-12-07 DIAGNOSIS — Z12.31 VISIT FOR SCREENING MAMMOGRAM: ICD-10-CM

## 2023-12-07 PROCEDURE — 77067 SCR MAMMO BI INCL CAD: CPT

## 2024-01-03 ENCOUNTER — OFFICE VISIT (OUTPATIENT)
Dept: FAMILY MEDICINE | Facility: CLINIC | Age: 54
End: 2024-01-03
Payer: COMMERCIAL

## 2024-01-03 VITALS
DIASTOLIC BLOOD PRESSURE: 72 MMHG | TEMPERATURE: 97.9 F | OXYGEN SATURATION: 98 % | RESPIRATION RATE: 14 BRPM | HEART RATE: 86 BPM | SYSTOLIC BLOOD PRESSURE: 122 MMHG | HEIGHT: 64 IN | WEIGHT: 159 LBS | BODY MASS INDEX: 27.14 KG/M2

## 2024-01-03 DIAGNOSIS — Z13.1 SCREENING FOR DIABETES MELLITUS: ICD-10-CM

## 2024-01-03 DIAGNOSIS — E61.1 IRON DEFICIENCY: ICD-10-CM

## 2024-01-03 DIAGNOSIS — Z13.220 LIPID SCREENING: ICD-10-CM

## 2024-01-03 DIAGNOSIS — Z00.00 ENCOUNTER FOR ROUTINE HISTORY AND PHYSICAL EXAMINATION OF ADULT: Primary | ICD-10-CM

## 2024-01-03 DIAGNOSIS — Z01.84 ANTIBODY RESPONSE EXAMINATION: ICD-10-CM

## 2024-01-03 DIAGNOSIS — E53.8 VITAMIN B12 DEFICIENCY (NON ANEMIC): ICD-10-CM

## 2024-01-03 DIAGNOSIS — E55.9 VITAMIN D DEFICIENCY: ICD-10-CM

## 2024-01-03 DIAGNOSIS — Z12.11 SCREEN FOR COLON CANCER: ICD-10-CM

## 2024-01-03 DIAGNOSIS — N92.6 IRREGULAR PERIODS: ICD-10-CM

## 2024-01-03 DIAGNOSIS — Z23 IMMUNIZATION DUE: ICD-10-CM

## 2024-01-03 LAB
ALBUMIN SERPL BCG-MCNC: 4.2 G/DL (ref 3.5–5.2)
ALP SERPL-CCNC: 136 U/L (ref 40–150)
ALT SERPL W P-5'-P-CCNC: 15 U/L (ref 0–50)
ANION GAP SERPL CALCULATED.3IONS-SCNC: 9 MMOL/L (ref 7–15)
AST SERPL W P-5'-P-CCNC: 18 U/L (ref 0–45)
BILIRUB SERPL-MCNC: 0.3 MG/DL
BUN SERPL-MCNC: 15.5 MG/DL (ref 6–20)
CALCIUM SERPL-MCNC: 9.2 MG/DL (ref 8.6–10)
CHLORIDE SERPL-SCNC: 100 MMOL/L (ref 98–107)
CHOLEST SERPL-MCNC: 190 MG/DL
CREAT SERPL-MCNC: 0.63 MG/DL (ref 0.51–0.95)
DEPRECATED HCO3 PLAS-SCNC: 26 MMOL/L (ref 22–29)
EGFRCR SERPLBLD CKD-EPI 2021: >90 ML/MIN/1.73M2
ERYTHROCYTE [DISTWIDTH] IN BLOOD BY AUTOMATED COUNT: 11.8 % (ref 10–15)
FASTING STATUS PATIENT QL REPORTED: NORMAL
GLUCOSE SERPL-MCNC: 95 MG/DL (ref 70–99)
HBA1C MFR BLD: 5.6 % (ref 0–5.6)
HCT VFR BLD AUTO: 39.1 % (ref 35–47)
HDLC SERPL-MCNC: 61 MG/DL
HGB BLD-MCNC: 13.1 G/DL (ref 11.7–15.7)
IRON BINDING CAPACITY (ROCHE): 383 UG/DL (ref 240–430)
IRON SATN MFR SERPL: 15 % (ref 15–46)
IRON SERPL-MCNC: 57 UG/DL (ref 37–145)
LDLC SERPL CALC-MCNC: 100 MG/DL
MCH RBC QN AUTO: 28.8 PG (ref 26.5–33)
MCHC RBC AUTO-ENTMCNC: 33.5 G/DL (ref 31.5–36.5)
MCV RBC AUTO: 86 FL (ref 78–100)
NONHDLC SERPL-MCNC: 129 MG/DL
PLATELET # BLD AUTO: 255 10E3/UL (ref 150–450)
POTASSIUM SERPL-SCNC: 4 MMOL/L (ref 3.4–5.3)
PROT SERPL-MCNC: 7.9 G/DL (ref 6.4–8.3)
RBC # BLD AUTO: 4.55 10E6/UL (ref 3.8–5.2)
SODIUM SERPL-SCNC: 135 MMOL/L (ref 135–145)
TRIGL SERPL-MCNC: 143 MG/DL
TSH SERPL DL<=0.005 MIU/L-ACNC: 2.66 UIU/ML (ref 0.3–4.2)
VIT B12 SERPL-MCNC: 282 PG/ML (ref 232–1245)
VIT D+METAB SERPL-MCNC: 28 NG/ML (ref 20–50)
WBC # BLD AUTO: 5.5 10E3/UL (ref 4–11)

## 2024-01-03 PROCEDURE — 82607 VITAMIN B-12: CPT | Performed by: FAMILY MEDICINE

## 2024-01-03 PROCEDURE — 83036 HEMOGLOBIN GLYCOSYLATED A1C: CPT | Performed by: FAMILY MEDICINE

## 2024-01-03 PROCEDURE — 82306 VITAMIN D 25 HYDROXY: CPT | Performed by: FAMILY MEDICINE

## 2024-01-03 PROCEDURE — 80053 COMPREHEN METABOLIC PANEL: CPT | Performed by: FAMILY MEDICINE

## 2024-01-03 PROCEDURE — 99396 PREV VISIT EST AGE 40-64: CPT | Mod: 25 | Performed by: FAMILY MEDICINE

## 2024-01-03 PROCEDURE — 83540 ASSAY OF IRON: CPT | Performed by: FAMILY MEDICINE

## 2024-01-03 PROCEDURE — 85027 COMPLETE CBC AUTOMATED: CPT | Performed by: FAMILY MEDICINE

## 2024-01-03 PROCEDURE — 90750 HZV VACC RECOMBINANT IM: CPT | Performed by: FAMILY MEDICINE

## 2024-01-03 PROCEDURE — 83550 IRON BINDING TEST: CPT | Performed by: FAMILY MEDICINE

## 2024-01-03 PROCEDURE — 84443 ASSAY THYROID STIM HORMONE: CPT | Performed by: FAMILY MEDICINE

## 2024-01-03 PROCEDURE — 99213 OFFICE O/P EST LOW 20 MIN: CPT | Mod: 25 | Performed by: FAMILY MEDICINE

## 2024-01-03 PROCEDURE — 80061 LIPID PANEL: CPT | Performed by: FAMILY MEDICINE

## 2024-01-03 PROCEDURE — 90471 IMMUNIZATION ADMIN: CPT | Performed by: FAMILY MEDICINE

## 2024-01-03 PROCEDURE — 86706 HEP B SURFACE ANTIBODY: CPT | Performed by: FAMILY MEDICINE

## 2024-01-03 PROCEDURE — 36415 COLL VENOUS BLD VENIPUNCTURE: CPT | Performed by: FAMILY MEDICINE

## 2024-01-03 RX ORDER — FLUTICASONE PROPIONATE 50 MCG
1 SPRAY, SUSPENSION (ML) NASAL DAILY
COMMUNITY

## 2024-01-03 ASSESSMENT — ENCOUNTER SYMPTOMS
NERVOUS/ANXIOUS: 0
HEMATURIA: 0
COUGH: 0
PALPITATIONS: 0
EYE PAIN: 0
FEVER: 0
CONSTIPATION: 0
MYALGIAS: 0
JOINT SWELLING: 0
NAUSEA: 0
DYSURIA: 0
HEMATOCHEZIA: 0
WEAKNESS: 0
PARESTHESIAS: 0
DIARRHEA: 0
ARTHRALGIAS: 0
FREQUENCY: 0
ABDOMINAL PAIN: 0
DIZZINESS: 0
SHORTNESS OF BREATH: 0
HEARTBURN: 0
HEADACHES: 0
CHILLS: 0
BREAST MASS: 0
SORE THROAT: 0

## 2024-01-03 NOTE — PROGRESS NOTES
SUBJECTIVE:   Renetta is a 53 year old, presenting for the following:  Physical (Irregular periods. )        1/3/2024    10:49 AM   Additional Questions   Roomed by Rukhsana       Healthy Habits:     Getting at least 3 servings of Calcium per day:  Yes    Bi-annual eye exam:  Yes    Dental care twice a year:  Yes    Sleep apnea or symptoms of sleep apnea:  None    Diet:  Regular (no restrictions)    Frequency of exercise:  4-5 days/week    Duration of exercise:  30-45 minutes    Taking medications regularly:  Yes    Medication side effects:  Not applicable    Additional concerns today:  No    She is still having periods, they are pretty irregular. She can have them every other month to two times a month. She does mainly spot. She is not having hot flashes, did have a few months of feeling cold last year, but that's better too.       Social History     Tobacco Use    Smoking status: Never    Smokeless tobacco: Never   Substance Use Topics    Alcohol use: No             1/3/2024    10:51 AM   Alcohol Use   Prescreen: >3 drinks/day or >7 drinks/week? Not Applicable     Reviewed orders with patient.  Reviewed health maintenance and updated orders accordingly - Yes      Breast Cancer Screening:    FHS-7:       9/10/2021     1:47 PM 5/3/2022    11:02 AM 10/17/2022    12:03 PM 6/13/2023     3:16 PM 12/7/2023     3:21 PM 1/3/2024    10:53 AM   Breast CA Risk Assessment (FHS-7)   Did any of your first-degree relatives have breast or ovarian cancer? Yes No No Yes No No   Did any of your relatives have bilateral breast cancer? No Unknown No No No    Did any man in your family have breast cancer? No No No No No No   Did any woman in your family have breast and ovarian cancer? No Yes Yes Yes No Yes   Did any woman in your family have breast cancer before age 50 y? No Yes No No Yes Yes   Do you have 2 or more relatives with breast and/or ovarian cancer? Yes Yes Yes Yes Yes Yes   Do you have 2 or more relatives with breast and/or  "bowel cancer? Yes Yes No Yes Yes Yes       Pertinent mammograms are reviewed under the imaging tab.    History of abnormal Pap smear: NO - age 30-65 PAP every 5 years with negative HPV co-testing recommended      Latest Ref Rng & Units 3/4/2022    11:12 AM 10/22/2018     3:44 PM   PAP / HPV   PAP  Negative for Intraepithelial Lesion or Malignancy (NILM)  Negative for squamous intraepithelial lesion or malignancy  Electronically signed by Cheryl Juarez CT (ASCP) on 10/26/2018 at  3:33 PM      HPV 16 DNA Negative Negative  Negative    HPV 18 DNA Negative Negative  Negative    Other HR HPV Negative Negative  Negative      Reviewed and updated as needed this visit by clinical staff   Tobacco  Allergies  Meds  Problems  Med Hx  Surg Hx  Fam Hx          Reviewed and updated as needed this visit by Provider   Tobacco  Allergies  Meds  Problems  Med Hx  Surg Hx  Fam Hx             Review of Systems   Constitutional:  Negative for chills and fever.   HENT:  Negative for congestion, ear pain, hearing loss and sore throat.    Eyes:  Negative for pain and visual disturbance.   Respiratory:  Negative for cough and shortness of breath.    Cardiovascular:  Negative for chest pain, palpitations and peripheral edema.   Gastrointestinal:  Negative for abdominal pain, constipation, diarrhea, heartburn, hematochezia and nausea.   Breasts:  Negative for tenderness, breast mass and discharge.   Genitourinary:  Negative for dysuria, frequency, genital sores, hematuria, pelvic pain, urgency, vaginal bleeding and vaginal discharge.   Musculoskeletal:  Negative for arthralgias, joint swelling and myalgias.   Skin:  Negative for rash.   Neurological:  Negative for dizziness, weakness, headaches and paresthesias.   Psychiatric/Behavioral:  Negative for mood changes. The patient is not nervous/anxious.         OBJECTIVE:   /72   Pulse 86   Temp 97.9  F (36.6  C)   Resp 14   Ht 1.626 m (5' 4\")   Wt 72.1 kg (159 lb)  "  LMP 11/15/2023   SpO2 98%   BMI 27.29 kg/m    Physical Exam  GENERAL: healthy, alert and no distress  EYES: Eyes grossly normal to inspection, PERRL and conjunctivae and sclerae normal  HENT: ear canals and TM's normal, nose and mouth without ulcers or lesions  NECK: no adenopathy, no asymmetry, masses, or scars and thyroid normal to palpation  RESP: lungs clear to auscultation - no rales, rhonchi or wheezes  BREAST: normal without masses, tenderness or nipple discharge and no palpable axillary masses or adenopathy  CV: regular rate and rhythm, normal S1 S2, no S3 or S4, no murmur, click or rub, no peripheral edema and peripheral pulses strong  ABDOMEN: soft, nontender, no hepatosplenomegaly, no masses and bowel sounds normal   (female): deferred  MS: no gross musculoskeletal defects noted, no edema  SKIN: no suspicious lesions or rashes  NEURO: Normal strength and tone, mentation intact and speech normal  PSYCH: mentation appears normal, affect normal/bright      ASSESSMENT/PLAN:   Renetta was seen today for physical.    Diagnoses and all orders for this visit:    Encounter for routine history and physical examination of adult   Routine health maintenance discussion:  No smoking, limited alcohol (7 or less servings per week), 5 fruits/veg servings per day, 200 minutes of exercise per week.  Daily calcium/vitamin D guidelines, bone health, colon cancer screening beginning at age 50.  Accident avoidance, sun screen.     Iron deficiency  -     CBC with platelets; Future  -     Iron and iron binding capacity; Future  -     CBC with platelets  -     Iron and iron binding capacity  - Doing well, update labs    Irregular periods  -     TSH with free T4 reflex; Future  -     TSH with free T4 reflex    Antibody response examination  -     Hepatitis B Surface Antibody; Future  -     Hepatitis B Surface Antibody  - We discussed that I am fairly certain she would have had her hepatitis B vaccine series with immigration,  "however given her immigration status happening in the late 90s I will update her antibody status first.    Vitamin B12 deficiency (non anemic)  -     Vitamin B12; Future  -     Vitamin B12    Vitamin D deficiency  -     Vitamin D deficiency screening; Future  -     Vitamin D deficiency screening    Lipid screening  -     Lipid panel reflex to direct LDL Non-fasting; Future  -     Lipid panel reflex to direct LDL Non-fasting    Screen for colon cancer  -     Colonoscopy Screening  Referral; Future    Screening for diabetes mellitus  -     Comprehensive metabolic panel (BMP + Alb, Alk Phos, ALT, AST, Total. Bili, TP); Future  -     Hemoglobin A1c; Future  -     Comprehensive metabolic panel (BMP + Alb, Alk Phos, ALT, AST, Total. Bili, TP)  -     Hemoglobin A1c    Immunization due  -     ZOSTER RECOMBINANT ADJUVANTED (SHINGRIX); Standing    Other orders  -     REVIEW OF HEALTH MAINTENANCE PROTOCOL ORDERS        Patient has been advised of split billing requirements and indicates understanding: Yes      COUNSELING:  Reviewed preventive health counseling, as reflected in patient instructions       Regular exercise       Healthy diet/nutrition       Vision screening       Osteoporosis prevention/bone health       Colorectal Cancer Screening       Advance Care Planning      BMI:   Estimated body mass index is 27.29 kg/m  as calculated from the following:    Height as of this encounter: 1.626 m (5' 4\").    Weight as of this encounter: 72.1 kg (159 lb).   Weight management plan: Discussed healthy diet and exercise guidelines      She reports that she has never smoked. She has never used smokeless tobacco.          Nancy Mata MD  [employer]  "

## 2024-01-04 LAB
HBV SURFACE AB SERPL IA-ACNC: <3.5 M[IU]/ML
HBV SURFACE AB SERPL IA-ACNC: NONREACTIVE M[IU]/ML

## 2024-01-18 ENCOUNTER — HOSPITAL ENCOUNTER (OUTPATIENT)
Dept: ULTRASOUND IMAGING | Facility: CLINIC | Age: 54
Discharge: HOME | End: 2024-01-18
Attending: FAMILY MEDICINE | Admitting: FAMILY MEDICINE
Payer: COMMERCIAL

## 2024-01-18 DIAGNOSIS — N92.6 IRREGULAR PERIODS: ICD-10-CM

## 2024-01-18 PROCEDURE — 76830 TRANSVAGINAL US NON-OB: CPT

## 2024-01-18 PROCEDURE — 93976 VASCULAR STUDY: CPT | Mod: XU

## 2024-04-18 ENCOUNTER — ALLIED HEALTH/NURSE VISIT (OUTPATIENT)
Dept: FAMILY MEDICINE | Facility: CLINIC | Age: 54
End: 2024-04-18
Payer: COMMERCIAL

## 2024-04-18 DIAGNOSIS — Z23 ENCOUNTER FOR IMMUNIZATION: Primary | ICD-10-CM

## 2024-04-18 DIAGNOSIS — Z23 IMMUNIZATION DUE: ICD-10-CM

## 2024-04-18 PROCEDURE — 90750 HZV VACC RECOMBINANT IM: CPT | Performed by: FAMILY MEDICINE

## 2024-04-18 PROCEDURE — 99207 PR NO CHARGE NURSE ONLY: CPT | Performed by: FAMILY MEDICINE

## 2024-04-18 PROCEDURE — 90746 HEPB VACCINE 3 DOSE ADULT IM: CPT | Performed by: FAMILY MEDICINE

## 2024-04-18 PROCEDURE — 90471 IMMUNIZATION ADMIN: CPT | Performed by: FAMILY MEDICINE

## 2024-04-18 PROCEDURE — 90472 IMMUNIZATION ADMIN EACH ADD: CPT | Performed by: FAMILY MEDICINE

## 2024-04-18 NOTE — PROGRESS NOTES
Prior to immunization administration, verified patients identity using patient s name and date of birth. Please see Immunization Activity for additional information.     Screening Questionnaire for Adult Immunization    Are you sick today?   No   Do you have allergies to medications, food, a vaccine component or latex?   No   Have you ever had a serious reaction after receiving a vaccination?   No   Do you have a long-term health problem with heart, lung, kidney, or metabolic disease (e.g., diabetes), asthma, a blood disorder, no spleen, complement component deficiency, a cochlear implant, or a spinal fluid leak?  Are you on long-term aspirin therapy?   No   Do you have cancer, leukemia, HIV/AIDS, or any other immune system problem?   No   Do you have a parent, brother, or sister with an immune system problem?   No   In the past 3 months, have you taken medications that affect  your immune system, such as prednisone, other steroids, or anticancer drugs; drugs for the treatment of rheumatoid arthritis, Crohn s disease, or psoriasis; or have you had radiation treatments?   No   Have you had a seizure, or a brain or other nervous system problem?   No   During the past year, have you received a transfusion of blood or blood    products, or been given immune (gamma) globulin or antiviral drug?   No   For women: Are you pregnant or is there a chance you could become       pregnant during the next month?   No   Have you received any vaccinations in the past 4 weeks?   No     Immunization questionnaire answers were all negative.    I have reviewed the following standing orders:   This patient is due and qualifies for the Hepatitis B vaccine.    Click here for Hepatitis B Standing Order    I have reviewed the vaccines inclusion and exclusion criteria; No concerns regarding eligibility.         This patient is due and qualifies for the Zoster vaccine.    Click here for Zoster Standing Order    I have reviewed the vaccines  inclusion and exclusion criteria; No concerns regarding eligibility.     Patient instructed to remain in clinic for 15 minutes afterwards, and to report any adverse reactions.     Screening performed by Nancy Contreras CMA on 4/18/2024 at 1:58 PM.

## 2024-04-26 ENCOUNTER — ANCILLARY PROCEDURE (OUTPATIENT)
Dept: GENERAL RADIOLOGY | Facility: CLINIC | Age: 54
End: 2024-04-26
Attending: FAMILY MEDICINE
Payer: COMMERCIAL

## 2024-04-26 ENCOUNTER — OFFICE VISIT (OUTPATIENT)
Dept: FAMILY MEDICINE | Facility: CLINIC | Age: 54
End: 2024-04-26
Payer: COMMERCIAL

## 2024-04-26 VITALS
SYSTOLIC BLOOD PRESSURE: 110 MMHG | RESPIRATION RATE: 17 BRPM | WEIGHT: 171 LBS | HEIGHT: 64 IN | HEART RATE: 75 BPM | OXYGEN SATURATION: 99 % | TEMPERATURE: 98.1 F | BODY MASS INDEX: 29.19 KG/M2 | DIASTOLIC BLOOD PRESSURE: 80 MMHG

## 2024-04-26 DIAGNOSIS — M54.42 ACUTE BILATERAL LOW BACK PAIN WITH LEFT-SIDED SCIATICA: Primary | ICD-10-CM

## 2024-04-26 DIAGNOSIS — M54.42 ACUTE BILATERAL LOW BACK PAIN WITH LEFT-SIDED SCIATICA: ICD-10-CM

## 2024-04-26 PROCEDURE — 72200 X-RAY EXAM SI JOINTS: CPT | Mod: TC | Performed by: RADIOLOGY

## 2024-04-26 PROCEDURE — 99213 OFFICE O/P EST LOW 20 MIN: CPT | Performed by: FAMILY MEDICINE

## 2024-04-26 PROCEDURE — 72100 X-RAY EXAM L-S SPINE 2/3 VWS: CPT | Mod: TC | Performed by: RADIOLOGY

## 2024-04-26 RX ORDER — METHYLPREDNISOLONE 4 MG
TABLET, DOSE PACK ORAL
Qty: 21 TABLET | Refills: 0 | Status: SHIPPED | OUTPATIENT
Start: 2024-04-26

## 2024-04-26 RX ORDER — CYCLOBENZAPRINE HCL 5 MG
5 TABLET ORAL 3 TIMES DAILY PRN
Qty: 21 TABLET | Refills: 1 | Status: SHIPPED | OUTPATIENT
Start: 2024-04-26

## 2024-04-26 ASSESSMENT — PATIENT HEALTH QUESTIONNAIRE - PHQ9
SUM OF ALL RESPONSES TO PHQ QUESTIONS 1-9: 0
SUM OF ALL RESPONSES TO PHQ QUESTIONS 1-9: 0
10. IF YOU CHECKED OFF ANY PROBLEMS, HOW DIFFICULT HAVE THESE PROBLEMS MADE IT FOR YOU TO DO YOUR WORK, TAKE CARE OF THINGS AT HOME, OR GET ALONG WITH OTHER PEOPLE: NOT DIFFICULT AT ALL

## 2024-04-26 ASSESSMENT — PAIN SCALES - GENERAL: PAINLEVEL: SEVERE PAIN (6)

## 2024-04-26 ASSESSMENT — ENCOUNTER SYMPTOMS: BACK PAIN: 1

## 2024-04-26 NOTE — PROGRESS NOTES
Assessment & Plan     Acute bilateral low back pain with left-sided sciatica  -I think that she has sacroiliitis.  Given her daughter's upcoming wedding I will have her trial some Medrol, Flexeril as needed for sleep and if things or not improving she will let me know and we can consider referral for physical therapy.  She may consider seeing a chiropractor as well.  - XR Lumbar Spine 2/3 Views  - XR Sacroiliac Joint 1/2 Views  - methylPREDNISolone (MEDROL DOSEPAK) 4 MG tablet therapy pack  Dispense: 21 tablet; Refill: 0  - cyclobenzaprine (FLEXERIL) 5 MG tablet  Dispense: 21 tablet; Refill: 1                    Subjective   Renetta is a 54 year old, presenting for the following health issues:  Back Pain (Left lower back pain, goes down into left buttock - ongoing for a couple weeks)        4/18/2024     2:03 PM   Additional Questions   Roomed by Gen TUCKER CMA     Back Pain     History of Present Illness       Back Pain:  She presents for follow up of back pain. Patient's back pain is a new problem.    Original cause of back pain: turning/bending  First noticed back pain: 1-4 weeks ago  Patient feels back pain: comes and goesLocation of back pain:  Left lower back  Description of back pain: dull ache  Back pain spreads: left buttocks    Since patient first noticed back pain, pain is: unchanged  Does back pain interfere with her job:  No  On a scale of 1-10 (10 being the worst), patient describes pain as:  5  What makes back pain worse: certain positions, lying down and standing   Acupuncture: not tried  Acetaminophen: helpful  Activity or exercise: not tried  Chiropractor:  Not tried  Cold: not tried  Heat: helpful  Massage: helpful  Muscle relaxants: helpful  NSAIDS: helpful  Opioids: not tried  Physical Therapy: not tried  Rest: helpful  Steroid Injection: not tried  Stretching: not helpful  Surgery: not tried  TENS unit: not tried  Topical pain relievers: not tried  Other healthcare providers patient is seeing for  "back pain: None    She eats 2-3 servings of fruits and vegetables daily.She consumes 1 sweetened beverage(s) daily.She exercises with enough effort to increase her heart rate 20 to 29 minutes per day.  She exercises with enough effort to increase her heart rate 4 days per week.   She is taking medications regularly.       Here today for back pain for the last month. She does not recall an injury, has been working out for about 2 months. It's hard for her to turn over in bed. She does have pain in the left low back, can be a little more lateral. Not changing, not worsening, not getting better. She will use ibuprofen for long periods of time with sitting or standing.     She took two this morning and does not have pain. It feels like an ache.    Her daughter is getting  next month, they do have a lot going on with this.            Objective    Temp 98.1  F (36.7  C)   Resp 17   Ht 1.626 m (5' 4\")   BMI 27.29 kg/m    Body mass index is 27.29 kg/m .  Physical Exam   GENERAL: alert and no distress  RESP: lungs clear to auscultation - no rales, rhonchi or wheezes  CV: regular rate and rhythm, normal S1 S2, no S3 or S4, no murmur, click or rub, no peripheral edema  MS: no gross musculoskeletal defects noted, no edema, normal strength in lower extremities with the exception of left knee flexion which is just slightly decreased compared to the right  BACK: no CVA tenderness, no paralumbar tenderness, left SI joint is tender to palpation  Neuro: Normal symmetric reflexes at patellar and Achilles tendons bilaterally              Signed Electronically by: Nancy Mata MD    "

## 2024-04-26 NOTE — PATIENT INSTRUCTIONS
Dr. Stevens  [email or web address]  [phone]     [facility]   [email or web address]  [phone]

## 2024-06-05 ENCOUNTER — TELEPHONE (OUTPATIENT)
Dept: FAMILY MEDICINE | Facility: CLINIC | Age: 54
End: 2024-06-05
Payer: COMMERCIAL

## 2024-06-05 DIAGNOSIS — Z23 ENCOUNTER FOR HERPES ZOSTER VACCINATION: Primary | ICD-10-CM

## 2024-06-05 NOTE — TELEPHONE ENCOUNTER
Message left for patient to return call to clinic.   See letters/communication with date of 6/5/24 for call information to relay.

## 2024-06-28 ENCOUNTER — ALLIED HEALTH/NURSE VISIT (OUTPATIENT)
Dept: FAMILY MEDICINE | Facility: CLINIC | Age: 54
End: 2024-06-28
Payer: COMMERCIAL

## 2024-06-28 DIAGNOSIS — Z23 ENCOUNTER FOR HERPES ZOSTER VACCINATION: ICD-10-CM

## 2024-06-28 PROCEDURE — 90750 HZV VACC RECOMBINANT IM: CPT

## 2024-06-28 PROCEDURE — 90471 IMMUNIZATION ADMIN: CPT

## 2024-06-28 PROCEDURE — 99207 PR NO CHARGE NURSE ONLY: CPT

## 2024-06-28 NOTE — PROGRESS NOTES
Prior to immunization administration, verified patients identity using patient s name and date of birth. Please see Immunization Activity for additional information.     Screening Questionnaire for Adult Immunization    Are you sick today?   No   Do you have allergies to medications, food, a vaccine component or latex?   No   Have you ever had a serious reaction after receiving a vaccination?   No   Do you have a long-term health problem with heart, lung, kidney, or metabolic disease (e.g., diabetes), asthma, a blood disorder, no spleen, complement component deficiency, a cochlear implant, or a spinal fluid leak?  Are you on long-term aspirin therapy?   No   Do you have cancer, leukemia, HIV/AIDS, or any other immune system problem?   No   Do you have a parent, brother, or sister with an immune system problem?   No   In the past 3 months, have you taken medications that affect  your immune system, such as prednisone, other steroids, or anticancer drugs; drugs for the treatment of rheumatoid arthritis, Crohn s disease, or psoriasis; or have you had radiation treatments?   No   Have you had a seizure, or a brain or other nervous system problem?   No   During the past year, have you received a transfusion of blood or blood    products, or been given immune (gamma) globulin or antiviral drug?   No   For women: Are you pregnant or is there a chance you could become       pregnant during the next month?   No   Have you received any vaccinations in the past 4 weeks?   No     Immunization questionnaire answers were all negative.    I have reviewed the following standing orders:   This patient is due and qualifies for the Zoster vaccine.    Click here for Zoster Standing Order    I have reviewed the vaccines inclusion and exclusion criteria; No concerns regarding eligibility.     Patient instructed to remain in clinic for 15 minutes afterwards, and to report any adverse reactions.     Screening performed by Regina Lynn  MA on 6/28/2024 at 11:35 AM.

## 2024-06-28 NOTE — TELEPHONE ENCOUNTER
Patient came in for MA only visit for repeat shingles vaccine on 6/28/24.    Regina Lynn MA on 6/28/2024 at 11:37 AM

## 2024-11-25 NOTE — PROGRESS NOTES
ASSESSMENT & PLAN    Renetta was seen today for pain.    Diagnoses and all orders for this visit:    Chronic left-sided low back pain without sciatica  -     Physical Therapy  Referral; Future  -     diclofenac (VOLTAREN) 75 MG EC tablet; Take 1 tablet (75 mg) by mouth 2 times daily.        Low back lumbar and left SI pain without radiation. Neurologically intact without redflag symptoms reassuring less likely needing immediate surgical evaluation. Discussed surgical and nonsurgical options at this time including no treatment, activity modifications, Medications (NSAIDs, Tylenol, muscle relaxers), PT, or spine referral.  -Low back pain without radiation into feet or legs reassuring. Xrays with some loss of disc space L5, S1 most likely underlying cause.   -Santos tart with physical therapy   -diclofenac pill 75mg twice a day with food for two weeks. Then after two weeks can resume ibuprofen as needed.   -Lidocaine patches over the counter to help with pain relief.   -Voltaren gel ( or also known as diclofenac gel) to area of pain up to three times per day as needed. Can get over the counter at drug store.   (Check ingredients before buying to avoid allergies as has had some allergies to topical medications/medications in the past.   -Follow-up 4-8 weeks after starting PT or sooner if worsening.     [facility]    -----  No chief complaint on file.      SUBJECTIVE  Renetta Joyner is a/an 54 year old female who is seen as a self referral for evaluation of left hip pain.     The patient is seen by themselves.    Onset: 6 month(s) ago. Reports insidious onset without acute precipitating event.  Location of Pain: left posterior/ posterolateral hip  Worsened by: changing sides to sleep aggravate it. Fast changes to posture position in general aggravate. (Needs to do sit to stand slowly)  Better with: ibuprofen, heat  Treatments tried: ibuprofen,  heat   Associated symptoms: aching pain  Denies numbness, tingling, locking  Orthopedic/Surgical history: NO  Social History/Occupation:works from home- can change position and ergonomics as needed.     Patient Active Problem List   Diagnosis    Other fatigue    Glossodynia    Allergic rhinitis    Iron deficiency anemia secondary to inadequate dietary iron intake    Carpal tunnel syndrome    Ganglion    Menorrhagia    Ovarian cyst    Vitamin D deficiency    Foot pain, right       Current Outpatient Medications   Medication Sig Dispense Refill    cyclobenzaprine (FLEXERIL) 5 MG tablet Take 1 tablet (5 mg) by mouth 3 times daily as needed for muscle spasms 21 tablet 1    fluticasone (FLONASE) 50 MCG/ACT nasal spray Spray 1 spray into both nostrils daily      loratadine (CLARITIN) 10 mg tablet [LORATADINE (CLARITIN) 10 MG TABLET] Take 10 mg by mouth daily.      methylPREDNISolone (MEDROL DOSEPAK) 4 MG tablet therapy pack Follow Package Directions 21 tablet 0       PMH, Medications and Allergies were reviewed and updated as needed.    REVIEW OF SYSTEMS:  10 point ROS is negative other than symptoms noted above in HPI        OBJECTIVE:  There were no vitals taken for this visit.   General: healthy, alert and in no distress  Skin: no suspicious lesions or rash.  CV: distal perfusion intact   Resp: normal respiratory effort without conversational dyspnea   Psych: normal mood and affect  Gait: NORMAL  Neuro: Normal light sensory exam of bl Le extremity     THORACIC/LUMBAR SPINE  Inspection:    No gross deformity/asymmetry  Palpation:    Tender about the left para lumbar muscles and left SI joint. Otherwise remainder of landmarks are nontender.  Range of Motion:     Lumbar flexion full    Lumbar extension full  Strength:    Full strength throughout hips/quads/hamstrings  Special Tests:    Positive: none  Tightness of hamstrings with SLR    Negative: straight leg raise (left), SI joint compression (left), MARY (left), FADIR  (left)    RADIOLOGY:  Final results and radiologist's interpretation, available in the [facility] health record.  Images were reviewed with the patient in the office today.      122/2/2024:   Narrative & Impression   EXAM: XR LUMBAR SPINE 2/3 VIEWS  LOCATION: [facility]  DATE: 4/26/2024     INDICATION: new left low back pain with possible sciatica  COMPARISON: None.                                                                      IMPRESSION:   5 lumbar-type vertebral bodies. The vertebral bodies of the lumbar spine have normal stature and alignment without evidence of compression fracture. The disc spaces are well-maintained. No significant degenerative changes. Degenerative disc disease with   disc height loss at L5/S1. The remaining disc spaces are well-maintained. No other significant degenerative changes. Soft tissues unremarkable.     Narrative & Impression   EXAM: XR SACROILIAC JOINT 1/2 VIEWS  LOCATION: [facility]  DATE: 4/26/2024     INDICATION: new left low back pain  COMPARISON: None.                                                                      IMPRESSION: Both hips negative for fracture. Pelvis negative for fracture. Mild degenerative change at the lumbosacral interspace.             Greater than 45 minutes spent by me on the date of the encounter doing chart review . If procedure performed, this was separate from time with procedure.            Disclaimer: This note consists of symbols derived from keyboarding, dictation and/or voice recognition software. As a result, there may be errors in the script that have gone undetected. Please consider this when interpreting information found in this chart.

## 2024-12-02 ENCOUNTER — OFFICE VISIT (OUTPATIENT)
Dept: ORTHOPEDICS | Facility: CLINIC | Age: 54
End: 2024-12-02
Payer: COMMERCIAL

## 2024-12-02 VITALS — BODY MASS INDEX: 30.55 KG/M2 | WEIGHT: 178 LBS

## 2024-12-02 DIAGNOSIS — G89.29 CHRONIC LEFT-SIDED LOW BACK PAIN WITHOUT SCIATICA: Primary | ICD-10-CM

## 2024-12-02 DIAGNOSIS — M54.50 CHRONIC LEFT-SIDED LOW BACK PAIN WITHOUT SCIATICA: Primary | ICD-10-CM

## 2024-12-02 PROCEDURE — 99204 OFFICE O/P NEW MOD 45 MIN: CPT | Performed by: STUDENT IN AN ORGANIZED HEALTH CARE EDUCATION/TRAINING PROGRAM

## 2024-12-02 RX ORDER — DICLOFENAC SODIUM 75 MG/1
75 TABLET, DELAYED RELEASE ORAL 2 TIMES DAILY
Qty: 28 TABLET | Refills: 0 | Status: SHIPPED | OUTPATIENT
Start: 2024-12-02

## 2024-12-02 NOTE — PATIENT INSTRUCTIONS
1. Chronic left-sided low back pain without sciatica      -Low back pain without radiation into feet or legs reassuring. Xrays with some loss of disc space L5, S1 most likely underlying cause.   -Santos tart with physical therapy   -diclofenac pill 75mg twice a day with food for two weeks. Then after two weeks can resume ibuprofen as needed.   -Lidocaine patches over the counter to help with pain relief.   -Voltaren gel ( or also known as diclofenac gel) to area of pain up to three times per day as needed. Can get over the counter at drug store.   (Check ingredients before buying to avoid allergies)   -Follow-up 4-8 weeks after starting PT or sooner if worsening.     Please call [phone]  Ask for my team if you have any questions or concerns    Abigail Hicks DO  [facility]      Thank you for choosing [facility]!    CLINIC LOCATIONS:     [place]  TRIAGE LINE: [phone]   [address] [place] [address] APPOINTMENTS: [phone]   [place] [address] RADIOLOGY: [phone]   (Monday, Thursday & Friday) PHYSICAL THERAPY: [phone]    BILLING QUESTIONS: [phone]   [place] FAX: [phone] [address]    [place] [address]    (Wednesday)

## 2024-12-02 NOTE — LETTER
12/2/2024      Renetta Joyner  [address] [facility] [place] [address]      Dear Colleague,    Thank you for referring your patient, Renetta Joyner, to the [facility] [place]. Please see a copy of my visit note below.    ASSESSMENT & PLAN    Renetta was seen today for pain.    Diagnoses and all orders for this visit:    Chronic left-sided low back pain without sciatica  -     Physical Therapy  Referral; Future  -     diclofenac (VOLTAREN) 75 MG EC tablet; Take 1 tablet (75 mg) by mouth 2 times daily.        Low back lumbar and left SI pain without radiation. Neurologically intact without redflag symptoms reassuring less likely needing immediate surgical evaluation. Discussed surgical and nonsurgical options at this time including no treatment, activity modifications, Medications (NSAIDs, Tylenol, muscle relaxers), PT, or spine referral.  -Low back pain without radiation into feet or legs reassuring. Xrays with some loss of disc space L5, S1 most likely underlying cause.   -Santos tart with physical therapy   -diclofenac pill 75mg twice a day with food for two weeks. Then after two weeks can resume ibuprofen as needed.   -Lidocaine patches over the counter to help with pain relief.   -Voltaren gel ( or also known as diclofenac gel) to area of pain up to three times per day as needed. Can get over the counter at drug store.   (Check ingredients before buying to avoid allergies as has had some allergies to topical medications/medications in the past.   -Follow-up 4-8 weeks after starting PT or sooner if worsening.     Abigail Hicks DO  [facility]    -----  No chief complaint on file.      SUBJECTIVE  Renetta Joyner is a/an 54 year old female who is seen as a self referral for evaluation of left hip pain.     The patient is seen by themselves.    Onset: 6 month(s) ago. Reports insidious onset without acute  precipitating event.  Location of Pain: left posterior/ posterolateral hip  Worsened by: changing sides to sleep aggravate it. Fast changes to posture position in general aggravate. (Needs to do sit to stand slowly)  Better with: ibuprofen, heat  Treatments tried: ibuprofen, heat   Associated symptoms: aching pain  Denies numbness, tingling, locking  Orthopedic/Surgical history: NO  Social History/Occupation:works from home- can change position and ergonomics as needed.     Patient Active Problem List   Diagnosis     Other fatigue     Glossodynia     Allergic rhinitis     Iron deficiency anemia secondary to inadequate dietary iron intake     Carpal tunnel syndrome     Ganglion     Menorrhagia     Ovarian cyst     Vitamin D deficiency     Foot pain, right       Current Outpatient Medications   Medication Sig Dispense Refill     cyclobenzaprine (FLEXERIL) 5 MG tablet Take 1 tablet (5 mg) by mouth 3 times daily as needed for muscle spasms 21 tablet 1     fluticasone (FLONASE) 50 MCG/ACT nasal spray Spray 1 spray into both nostrils daily       loratadine (CLARITIN) 10 mg tablet [LORATADINE (CLARITIN) 10 MG TABLET] Take 10 mg by mouth daily.       methylPREDNISolone (MEDROL DOSEPAK) 4 MG tablet therapy pack Follow Package Directions 21 tablet 0       PMH, Medications and Allergies were reviewed and updated as needed.    REVIEW OF SYSTEMS:  10 point ROS is negative other than symptoms noted above in HPI        OBJECTIVE:  There were no vitals taken for this visit.   General: healthy, alert and in no distress  Skin: no suspicious lesions or rash.  CV: distal perfusion intact   Resp: normal respiratory effort without conversational dyspnea   Psych: normal mood and affect  Gait: NORMAL  Neuro: Normal light sensory exam of bl Le extremity     THORACIC/LUMBAR SPINE  Inspection:    No gross deformity/asymmetry  Palpation:    Tender about the left para lumbar muscles and left SI joint. Otherwise remainder of landmarks are  nontender.  Range of Motion:     Lumbar flexion full    Lumbar extension full  Strength:    Full strength throughout hips/quads/hamstrings  Special Tests:    Positive: none  Tightness of hamstrings with SLR    Negative: straight leg raise (left), SI joint compression (left), MARY (left), FADIR (left)    RADIOLOGY:  Final results and radiologist's interpretation, available in the [facility] health record.  Images were reviewed with the patient in the office today.      122/2/2024:   Narrative & Impression   EXAM: XR LUMBAR SPINE 2/3 VIEWS  LOCATION: [facility]  DATE: 4/26/2024     INDICATION: new left low back pain with possible sciatica  COMPARISON: None.                                                                      IMPRESSION:   5 lumbar-type vertebral bodies. The vertebral bodies of the lumbar spine have normal stature and alignment without evidence of compression fracture. The disc spaces are well-maintained. No significant degenerative changes. Degenerative disc disease with   disc height loss at L5/S1. The remaining disc spaces are well-maintained. No other significant degenerative changes. Soft tissues unremarkable.     Narrative & Impression   EXAM: XR SACROILIAC JOINT 1/2 VIEWS  LOCATION: [facility]  DATE: 4/26/2024     INDICATION: new left low back pain  COMPARISON: None.                                                                      IMPRESSION: Both hips negative for fracture. Pelvis negative for fracture. Mild degenerative change at the lumbosacral interspace.             Greater than 45 minutes spent by me on the date of the encounter doing chart review . If procedure performed, this was separate from time with procedure.            Disclaimer: This note consists of symbols derived from keyboarding, dictation and/or voice recognition software. As a result, there may be errors in the script that have gone undetected. Please consider this when  interpreting information found in this chart.       Again, thank you for allowing me to participate in the care of your patient.        Sincerely,        Abigail Hicks, DO

## 2024-12-06 ENCOUNTER — OFFICE VISIT (OUTPATIENT)
Dept: FAMILY MEDICINE | Facility: CLINIC | Age: 54
End: 2024-12-06
Payer: COMMERCIAL

## 2024-12-06 VITALS
TEMPERATURE: 97.7 F | DIASTOLIC BLOOD PRESSURE: 68 MMHG | HEART RATE: 75 BPM | OXYGEN SATURATION: 99 % | HEIGHT: 64 IN | RESPIRATION RATE: 16 BRPM | SYSTOLIC BLOOD PRESSURE: 116 MMHG | WEIGHT: 178 LBS | BODY MASS INDEX: 30.39 KG/M2

## 2024-12-06 DIAGNOSIS — H26.9 CATARACT OF RIGHT EYE, UNSPECIFIED CATARACT TYPE: ICD-10-CM

## 2024-12-06 DIAGNOSIS — Z01.818 PREOP GENERAL PHYSICAL EXAM: Primary | ICD-10-CM

## 2024-12-06 DIAGNOSIS — Z12.11 COLON CANCER SCREENING: ICD-10-CM

## 2024-12-06 DIAGNOSIS — E61.1 IRON DEFICIENCY: ICD-10-CM

## 2024-12-06 DIAGNOSIS — Z23 IMMUNIZATION DUE: ICD-10-CM

## 2024-12-06 LAB
ERYTHROCYTE [DISTWIDTH] IN BLOOD BY AUTOMATED COUNT: 18.3 % (ref 10–15)
HCT VFR BLD AUTO: 40 % (ref 35–47)
HGB BLD-MCNC: 12.9 G/DL (ref 11.7–15.7)
IRON BINDING CAPACITY (ROCHE): 361 UG/DL (ref 240–430)
IRON SATN MFR SERPL: 37 % (ref 15–46)
IRON SERPL-MCNC: 135 UG/DL (ref 37–145)
MCH RBC QN AUTO: 26.3 PG (ref 26.5–33)
MCHC RBC AUTO-ENTMCNC: 32.3 G/DL (ref 31.5–36.5)
MCV RBC AUTO: 82 FL (ref 78–100)
PLATELET # BLD AUTO: 247 10E3/UL (ref 150–450)
RBC # BLD AUTO: 4.91 10E6/UL (ref 3.8–5.2)
WBC # BLD AUTO: 6.2 10E3/UL (ref 4–11)

## 2024-12-06 PROCEDURE — 85027 COMPLETE CBC AUTOMATED: CPT | Performed by: FAMILY MEDICINE

## 2024-12-06 PROCEDURE — 90471 IMMUNIZATION ADMIN: CPT | Performed by: FAMILY MEDICINE

## 2024-12-06 PROCEDURE — 90746 HEPB VACCINE 3 DOSE ADULT IM: CPT | Performed by: FAMILY MEDICINE

## 2024-12-06 PROCEDURE — 99213 OFFICE O/P EST LOW 20 MIN: CPT | Mod: 25 | Performed by: FAMILY MEDICINE

## 2024-12-06 PROCEDURE — 83550 IRON BINDING TEST: CPT | Performed by: FAMILY MEDICINE

## 2024-12-06 PROCEDURE — 83540 ASSAY OF IRON: CPT | Performed by: FAMILY MEDICINE

## 2024-12-06 PROCEDURE — 90480 ADMN SARSCOV2 VAC 1/ONLY CMP: CPT | Performed by: FAMILY MEDICINE

## 2024-12-06 PROCEDURE — 36415 COLL VENOUS BLD VENIPUNCTURE: CPT | Performed by: FAMILY MEDICINE

## 2024-12-06 PROCEDURE — 91320 SARSCV2 VAC 30MCG TRS-SUC IM: CPT | Performed by: FAMILY MEDICINE

## 2024-12-06 RX ORDER — FERROUS SULFATE 325(65) MG
325 TABLET ORAL
COMMUNITY

## 2024-12-06 RX ORDER — FAMOTIDINE 20 MG
TABLET ORAL
COMMUNITY

## 2024-12-06 NOTE — PROGRESS NOTES
Preoperative Evaluation  [facility]  [address]  [place]  [facility] [address]  Phone: [phone]  Fax: [phone]  Primary Provider: Nancy Mata MD  Pre-op Performing Provider: Nancy Mata MD  Dec 6, 2024             12/6/2024   Surgical Information   What procedure is being done? catarat surgery    Facility or Hospital where procedure/surgery will be performed: [facility]    Who is doing the procedure / surgery? dr Kunal Maher    Date of surgery / procedure: 01/13/2025    Time of surgery / procedure: afternoon    Where do you plan to recover after surgery? at home with family        Patient-reported     Fax number for surgical facility: [phone]    Assessment & Plan     The proposed surgical procedure is considered LOW risk.    Preop general physical exam  -Medically optimized for this low risk procedure at this time without further intervention or evaluation.  Proceed with procedure as deemed medically necessary and appropriate by ophthalmology and anesthesia.    Cataract of right eye, unspecified cataract type  -Per above    Iron deficiency  -Patient has had iron deficiency in the past, will update lab work to ensure this has resolved  - Iron and iron binding capacity  - CBC with platelets  - Iron and iron binding capacity  - CBC with platelets    Immunization due  - HEPATITIS B, ADULT 20+ (ENGERIX-B/RECOMBIVAX HB)  - COVID-19 12+ (PFIZER)    Colon cancer screening  - Colonoscopy Screening  Referral          - No identified additional risk factors other than previously addressed    Preoperative Medication Instructions  Antiplatelet or Anticoagulation Medication Instructions   - Patient is on no antiplatelet or anticoagulation medications.    Additional Medication Instructions  Do not take your vitamins the day of your procedure    Recommendation  Approval given to proceed with proposed  procedure, without further diagnostic evaluation.    Gino Jackson is a 54 year old, presenting for the following:  Pre-Op Exam (DOS 1/13/25, Dr. Kunal Bhardwaj, Cataract surgery, [phone], [facility] [place] )          6/28/2024    11:35 AM   Additional Questions   Roomed by Regina Lynn   Accompanied by none     HPI related to upcoming procedure: Here today for a pre-op for her right cataract. She did have her left eye done last year and is going to have her right eye done this year.         12/6/2024   Pre-Op Questionnaire   Have you ever had a heart attack or stroke? No    Have you ever had surgery on your heart or blood vessels, such as a stent placement, a coronary artery bypass, or surgery on an artery in your head, neck, heart, or legs? No    Do you have chest pain with activity? No    Do you have a history of heart failure? No    Do you currently have a cold, bronchitis or symptoms of other infection? No    Do you have a cough, shortness of breath, or wheezing? No    Do you or anyone in your family have previous history of blood clots? No    Do you or does anyone in your family have a serious bleeding problem such as prolonged bleeding following surgeries or cuts? No    Have you ever had problems with anemia or been told to take iron pills? (!) YES with menstruation, taking iron    Have you had any abnormal blood loss such as black, tarry or bloody stools, or abnormal vaginal bleeding? No    Have you ever had a blood transfusion? No    Are you willing to have a blood transfusion if it is medically needed before, during, or after your surgery? Yes    Have you or any of your relatives ever had problems with anesthesia? No    Do you have sleep apnea, excessive snoring or daytime drowsiness? No    Do you have any artifical heart valves or other implanted medical devices like a pacemaker, defibrillator, or continuous glucose monitor? No    Do you have artificial joints? No    Are you allergic to  latex? No        Patient-reported     Health Care Directive  Patient does not have a Health Care Directive: Discussed advance care planning with patient; information given to patient to review.    Preoperative Review of    reviewed - no record of controlled substances prescribed.        Patient Active Problem List    Diagnosis Date Noted    Other fatigue      Priority: Medium     Created by Conversion      Formatting of this note might be different from the original.  Created by Conversion      Foot pain, right 02/20/2018     Priority: Medium    Allergic rhinitis      Priority: Medium     Created by Conversion  Replacement Utility updated for latest IMO load      Formatting of this note might be different from the original.  Created by Conversion    Replacement Utility updated for latest IMO load      Ganglion      Priority: Medium     Created by Conversion  [employer] [facility] Annotation: May 12 2011  6:41PM - Janine Stoll: Left wrist   radial side  Replacement Utility updated for latest IMO load      Formatting of this note might be different from the original.  Created by Conversion  [employer] [facility] Annotation: May 12 2011  6:41PM - Janine Stoll: Left wrist   radial side    Replacement Utility updated for latest IMO load      Vitamin D deficiency 04/22/2015     Priority: Medium    Ovarian cyst      Priority: Medium     Created by Conversion  [facility] Annotation: Jul 30 2014 11:46Danny Albarran: Complex cyst   on   US.      Formatting of this note might be different from the original.  Created by Conversion  [facility] Annotation: Jul 30 2014 11:46Danny Albarran: Complex cyst on   US.      Menorrhagia      Priority: Medium     Created by Conversion      Formatting of this note might be different from the original.  Created by Conversion      Glossodynia      Priority: Medium     Created by Conversion      Formatting of this note might be different from the original.  Created by Conversion      Iron  deficiency anemia secondary to inadequate dietary iron intake      Priority: Medium     Created by Conversion      Formatting of this note might be different from the original.  Created by Conversion      Carpal tunnel syndrome      Priority: Medium     Created by Conversion      Formatting of this note might be different from the original.  Created by Conversion        Past Medical History:   Diagnosis Date    Abdominal pain, left lower quadrant     Cough     History of pregnancy      - One  5/15/00, then 3 C-sections    Impetigo     Iron deficiency anemia secondary to inadequate dietary iron intake     Nonspecific abnormal results of thyroid function study     Other disorder of menstruation and other abnormal bleeding from female genital tract     Unspecified vitamin D deficiency     resolved     Past Surgical History:   Procedure Laterality Date    CATARACT EXTRACTION Left 2023     SECTION  2002    Son Marley     SECTION  2006    Daughter Adam     SECTION  2009    Daughter Carrie.     TUBAL LIGATION  2009    With last      Current Outpatient Medications   Medication Sig Dispense Refill    diclofenac (VOLTAREN) 75 MG EC tablet Take 1 tablet (75 mg) by mouth 2 times daily. 28 tablet 0    ferrous sulfate (FEROSUL) 325 (65 Fe) MG tablet Take 325 mg by mouth daily (with breakfast).      fluticasone (FLONASE) 50 MCG/ACT nasal spray Spray 1 spray into both nostrils daily      loratadine (CLARITIN) 10 mg tablet [LORATADINE (CLARITIN) 10 MG TABLET] Take 10 mg by mouth daily.      Vitamin D, Cholecalciferol, 25 MCG (1000 UT) CAPS Take by mouth.      cyclobenzaprine (FLEXERIL) 5 MG tablet Take 1 tablet (5 mg) by mouth 3 times daily as needed for muscle spasms 21 tablet 1    methylPREDNISolone (MEDROL DOSEPAK) 4 MG tablet therapy pack Follow Package Directions 21 tablet 0       Allergies   Allergen Reactions    Carbamates Itching    Cat Dander [Animal  "Dander] Cough    Propylene Glycol Itching        Social History     Tobacco Use    Smoking status: Never    Smokeless tobacco: Never   Substance Use Topics    Alcohol use: No     Family History   Problem Relation Age of Onset    Asthma Father     Breast Cancer Maternal Aunt 52.00    Breast Cancer Maternal Aunt 67.00    Breast Cancer Cousin 40.00    Pernicious anemia Brother         2 brothers with B12 deficiency, not known if pernicious anemia.     History   Drug Use No             Review of Systems  Constitutional, HEENT, cardiovascular, pulmonary, gi and gu systems are negative, except as otherwise noted.    Objective    /68   Pulse 75   Temp 97.7  F (36.5  C)   Resp 16   Ht 1.626 m (5' 4\")   Wt 80.7 kg (178 lb)   SpO2 99%   BMI 30.55 kg/m     Estimated body mass index is 30.55 kg/m  as calculated from the following:    Height as of this encounter: 1.626 m (5' 4\").    Weight as of this encounter: 80.7 kg (178 lb).      GENERAL: alert and no distress  NECK: no adenopathy, no asymmetry, masses, or scars  RESP: lungs clear to auscultation - no rales, rhonchi or wheezes  CV: regular rate and rhythm, normal S1 S2, no S3 or S4, no murmur, click or rub, no peripheral edema  ABDOMEN: soft, nontender, no hepatosplenomegaly, no masses and bowel sounds normal  MS: no gross musculoskeletal defects noted, no edema  PSYCH: mentation appears normal, affect normal/bright    Recent Labs   Lab Test 12/06/24  1439 01/03/24  1142   HGB 12.9 13.1    255   NA  --  135   POTASSIUM  --  4.0   CR  --  0.63   A1C  --  5.6        Diagnostics  Recent Results (from the past week)   Iron and iron binding capacity    Collection Time: 12/06/24  2:39 PM   Result Value Ref Range    Iron 135 37 - 145 ug/dL    Iron Binding Capacity 361 240 - 430 ug/dL    Iron Sat Index 37 15 - 46 %   CBC with platelets    Collection Time: 12/06/24  2:39 PM   Result Value Ref Range    WBC Count 6.2 4.0 - 11.0 10e3/uL    RBC Count 4.91 3.80 - 5.20 " 10e6/uL    Hemoglobin 12.9 11.7 - 15.7 g/dL    Hematocrit 40.0 35.0 - 47.0 %    MCV 82 78 - 100 fL    MCH 26.3 (L) 26.5 - 33.0 pg    MCHC 32.3 31.5 - 36.5 g/dL    RDW 18.3 (H) 10.0 - 15.0 %    Platelet Count 247 150 - 450 10e3/uL      No EKG required for low risk surgery (cataract, skin procedure, breast biopsy, etc).    Revised Cardiac Risk Index (RCRI)  The patient has the following serious cardiovascular risks for perioperative complications:   - No serious cardiac risks = 0 points     RCRI Interpretation: 0 points: Class I (very low risk - 0.4% complication rate)         Signed Electronically by: Nancy Mata MD  A copy of this evaluation report is provided to the requesting physician.

## 2024-12-06 NOTE — PATIENT INSTRUCTIONS
How to Take Your Medication Before Surgery  Preoperative Medication Instructions   Antiplatelet or Anticoagulation Medication Instructions   - Patient is on no antiplatelet or anticoagulation medications.    Additional Medication Instructions  Do not take your vitamins the day of your procedure       Patient Education   Preparing for Your Surgery  For Adults  Getting started  In most cases, a nurse will call to review your health history and instructions. They will give you an arrival time based on your scheduled surgery time. Please be ready to share:  Your doctor's clinic name and phone number  Your medical, surgical, and anesthesia history  A list of allergies and sensitivities  A list of medicines, including herbal treatments and over-the-counter drugs  Whether the patient has a legal guardian (ask how to send us the papers in advance)  Note: You may not receive a call if you were seen at our PAC (Preoperative Assessment Center).  Please tell us if you're pregnant--or if there's any chance you might be pregnant. Some surgeries may injure a fetus (unborn baby), so they require a pregnancy test. Surgeries that are safe for a fetus don't always need a test, and you can choose whether to have one.   Preparing for surgery  Within 10 to 30 days of surgery: Have a pre-op exam (sometimes called an H&P, or History and Physical). This can be done at a clinic or pre-operative center.  If you're having a , you may not need this exam. Talk to your care team.  At your pre-op exam, talk to your care team about all medicines you take. (This includes CBD oil and any drugs, such as THC, marijuana, and other forms of cannabis.) If you need to stop any medicine before surgery, ask when to start taking it again.  This is for your safety. Many medicines and drugs can make you bleed too much during surgery. Some change how well surgery (anesthesia) drugs work.  Call your insurance company to let them know you're having  surgery. (If you don't have insurance, call [phone].)  Call your clinic if there's any change in your health. This includes a scrape or scratch near the surgery site, or any signs of a cold (sore throat, runny nose, cough, rash, fever).  Eating and drinking guidelines  For your safety: Unless your surgeon tells you otherwise, follow the guidelines below.  Eat and drink as normal until 8 hours before you arrive for surgery. After that, no food or milk. You can spit out gum when you arrive.  Drink clear liquids until 2 hours before you arrive. These are liquids you can see through, like water, Gatorade, and Propel Water. They also include plain black coffee and tea (no cream or milk).  No alcohol for 24 hours before you arrive. The night before surgery, stop any drinks that contain THC.  If your care team tells you to take medicine on the morning of surgery, it's okay to take it with a sip of water. No other medicines or drugs are allowed (including CBD oil)--follow your care team's instructions.  If you have questions the day of surgery, call your hospital or surgery center.   Preventing infection  Shower or bathe the night before and the morning of surgery. Follow the instructions your clinic gave you. (If no instructions, use regular soap.)  Don't shave or clip hair near your surgery site. We'll remove the hair if needed.  Don't smoke or vape the morning of surgery. No chewing tobacco for 6 hours before you arrive. A nicotine patch is okay. You may spit out nicotine gum when you arrive.  For some surgeries, the surgeon will tell you to fully quit smoking and nicotine.  We will make every effort to keep you safe from infection. We will:  Clean our hands often with soap and water (or an alcohol-based hand rub).  Clean the skin at your surgery site with a special soap that kills germs.  Give you a special gown to keep you warm. (Cold raises the risk of infection.)  Wear hair covers, masks, gowns, and gloves  during surgery.  Give antibiotic medicine, if prescribed. Not all surgeries need this medicine.  What to bring on the day of surgery  Photo ID and insurance card  Copy of your health care directive, if you have one  Glasses and hearing aids (bring cases)  You can't wear contacts during surgery  Inhaler and eye drops, if you use them (tell us about these when you arrive)  CPAP machine or breathing device, if you use them  A few personal items, if spending the night  If you have . . .  A pacemaker, ICD (cardiac defibrillator), or other implant: Bring the ID card.  An implanted stimulator: Bring the remote control.  A legal guardian: Bring a copy of the certified (court-stamped) guardianship papers.  Please remove any jewelry, including body piercings. Leave jewelry and other valuables at home.  If you're going home the day of surgery  You must have a responsible adult drive you home. They should stay with you overnight as well.  If you don't have someone to stay with you, and you aren't safe to go home alone, we may keep you overnight. Insurance often won't pay for this.  After surgery  If it's hard to control your pain or you need more pain medicine, please call your surgeon's office.  Questions?   If you have any questions for your care team, list them here:   ____________________________________________________________________________________________________________________________________________________________________________________________________________________________________________________________  For informational purposes only. Not to replace the advice of your health care provider. Copyright   2003, 2019 [facility]. All rights reserved. Clinically reviewed by Jani Beckham MD. [employer] [id number] - REV 08/24.

## 2024-12-11 ENCOUNTER — HOSPITAL ENCOUNTER (OUTPATIENT)
Dept: MAMMOGRAPHY | Facility: CLINIC | Age: 54
Discharge: HOME | End: 2024-12-11
Attending: FAMILY MEDICINE
Payer: COMMERCIAL

## 2024-12-11 DIAGNOSIS — Z12.31 VISIT FOR SCREENING MAMMOGRAM: ICD-10-CM

## 2024-12-11 PROCEDURE — 77067 SCR MAMMO BI INCL CAD: CPT

## 2024-12-11 PROCEDURE — 77063 BREAST TOMOSYNTHESIS BI: CPT

## 2025-01-10 ASSESSMENT — ACTIVITIES OF DAILY LIVING (ADL)
HOS_ADL_HIGHEST_POTENTIAL_SCORE: 68
WALKING_APPROXIMATELY_10_MINUTES: NO DIFFICULTY AT ALL
GETTING INTO AND OUT OF AN AVERAGE CAR: SLIGHT DIFFICULTY
WALKING_UP_STEEP_HILLS: NO DIFFICULTY AT ALL
HOW_WOULD_YOU_RATE_YOUR_CURRENT_LEVEL_OF_FUNCTION_DURING_YOUR_USUAL_ACTIVITIES_OF_DAILY_LIVING_FROM_0_TO_100_WITH_100_BEING_YOUR_LEVEL_OF_FUNCTION_PRIOR_TO_YOUR_HIP_PROBLEM_AND_0_BEING_THE_INABILITY_TO_PERFORM_ANY_OF_YOUR_USUAL_DAILY_ACTIVITIES?: 100
WALKING_15_MINUTES_OR_GREATER: NO DIFFICULTY AT ALL
GOING UP 1 FLIGHT OF STAIRS: NO DIFFICULTY AT ALL
PUTTING ON SOCKS AND SHOES: NO DIFFICULTY AT ALL
HEAVY_WORK: SLIGHT DIFFICULTY
TWISTING/PIVOTING ON INVOLVED LEG: NO DIFFICULTY AT ALL
GETTING_INTO_AND_OUT_OF_A_BATHTUB: NO DIFFICULTY AT ALL
GOING DOWN 1 FLIGHT OF STAIRS: NO DIFFICULTY AT ALL
LIGHT_TO_MODERATE_WORK: SLIGHT DIFFICULTY
STEPPING UP AND DOWN CURBS: NO DIFFICULTY AT ALL
RECREATIONAL ACTIVITIES: NO DIFFICULTY AT ALL
HOS_ADL_ITEM_SCORE_TOTAL: 62
HOS_ADL_SCORE(%): 91.18
STANDING FOR 15 MINUTES: NO DIFFICULTY AT ALL
SITTING FOR 15 MINUTES: SLIGHT DIFFICULTY
ROLLING OVER IN BED: MODERATE DIFFICULTY
WALKING_DOWN_STEEP_HILLS: NO DIFFICULTY AT ALL
WALKING_INITIALLY: SLIGHT DIFFICULTY
DEEP SQUATTING: NO DIFFICULTY AT ALL

## 2025-01-21 DIAGNOSIS — R52 PAIN: Primary | ICD-10-CM

## 2025-01-22 RX ORDER — ACETAMINOPHEN 325 MG/1
TABLET ORAL
Qty: 100 TABLET | Refills: 0 | Status: SHIPPED | OUTPATIENT
Start: 2025-01-22

## 2025-02-06 ENCOUNTER — OFFICE VISIT (OUTPATIENT)
Dept: INTERNAL MEDICINE | Facility: CLINIC | Age: 55
End: 2025-02-06
Payer: COMMERCIAL

## 2025-02-06 VITALS
HEIGHT: 64 IN | DIASTOLIC BLOOD PRESSURE: 70 MMHG | WEIGHT: 177 LBS | HEART RATE: 67 BPM | RESPIRATION RATE: 16 BRPM | TEMPERATURE: 98.3 F | BODY MASS INDEX: 30.22 KG/M2 | SYSTOLIC BLOOD PRESSURE: 108 MMHG | OXYGEN SATURATION: 98 %

## 2025-02-06 DIAGNOSIS — M77.02 MEDIAL EPICONDYLITIS OF ELBOW, LEFT: Primary | ICD-10-CM

## 2025-02-06 RX ORDER — OMEGA-3 FATTY ACIDS/FISH OIL 300-1000MG
200 CAPSULE ORAL 2 TIMES DAILY
COMMUNITY
Start: 2025-02-06 | End: 2025-02-13

## 2025-02-06 RX ORDER — OMEGA-3 FATTY ACIDS/FISH OIL 300-1000MG
400 CAPSULE ORAL 2 TIMES DAILY
COMMUNITY
Start: 2025-02-06 | End: 2025-02-13

## 2025-02-06 ASSESSMENT — ENCOUNTER SYMPTOMS: BACK PAIN: 1

## 2025-02-06 NOTE — PATIENT INSTRUCTIONS
Take ibuprofen 2 to 3 times a day  Purchase elbow strap  Ice in as needed  Hold weight lifting for a couple weeks

## 2025-02-06 NOTE — PROGRESS NOTES
"  Assessment & Plan     Medial epicondylitis of elbow, left  -Ice as needed twice daily  - elbow strap   -rest/refrain from weightlifting using left arm for 7 to 10 days  Then reintroduce gradually with less weight     - ibuprofen (ADVIL/MOTRIN) 200 MG capsule; Take 2 capsules (400 mg) by mouth 2 times daily for 7 days.          BMI  Estimated body mass index is 30.38 kg/m  as calculated from the following:    Height as of this encounter: 1.626 m (5' 4\").    Weight as of this encounter: 80.3 kg (177 lb).             Subjective   -Patient reports 1 week left elbow pain medial aspect.  Notes with certain arm movements.  Notes when she lifts weights.  No real injury although she did bump her elbow fairly heavily into the door.  No prior history.  Well localized to left elbow.  Not going into the hand or wrist.  She has no neck or shoulder pain    She does take some ibuprofen for her back pain which is getting better.  She is due to see physical therapy.    She has never had this before.  It does not involve her right elbow at all.    The Advil she uses for her back does seem to help the elbow as well    Significantly she does go to the gym and does lift 12 pound weights in each hand/arm over her head which she has done for about 6 to 7 months this is only been a problem however for a week.        Objective    /70 (BP Location: Right arm, Patient Position: Sitting, Cuff Size: Adult Regular)   Pulse 67   Temp 98.3  F (36.8  C)   Resp 16   Ht 1.626 m (5' 4\")   Wt 80.3 kg (177 lb)   SpO2 98%   BMI 30.38 kg/m    Body mass index is 30.38 kg/m .  Physical Exam       Pleasant healthy appearing    Normal range of motion    Well localized medial left epicondylar discomfort.  Pain with internal rotation/pronation.  Strength normal          Signed Electronically by: Jaden Navas MD    "

## 2025-02-25 ASSESSMENT — ACTIVITIES OF DAILY LIVING (ADL)
RECREATIONAL_ACTIVITIES: NO DIFFICULTY AT ALL
GOING_DOWN_1_FLIGHT_OF_STAIRS: NO DIFFICULTY AT ALL
WALKING_INITIALLY: SLIGHT DIFFICULTY
DEEP SQUATTING: NO DIFFICULTY AT ALL
WALKING_UP_STEEP_HILLS: NO DIFFICULTY AT ALL
HEAVY_WORK: SLIGHT DIFFICULTY
LOW_IMPACT_ACTIVITIES_LIKE_FAST_WALKING: NO DIFFICULTY AT ALL
WALKING_DOWN_STEEP_HILLS: NO DIFFICULTY AT ALL
SPORTS_SCORE(%): 0
LANDING: SLIGHT DIFFICULTY
WALKING_15_MINUTES_OR_GREATER: NO DIFFICULTY AT ALL
ADL_TOTAL_ITEM_SCORE: 0
LIGHT_TO_MODERATE_WORK: SLIGHT DIFFICULTY
HOS_ADL_SCORE(%): 89.71
WALKING_APPROXIMATELY_10_MINUTES: NO DIFFICULTY AT ALL
ROLLING OVER IN BED: MODERATE DIFFICULTY
ROLLING_OVER_IN_BED: MODERATE DIFFICULTY
GETTING_INTO_AND_OUT_OF_A_BATHTUB: NO DIFFICULTY AT ALL
GOING UP 1 FLIGHT OF STAIRS: SLIGHT DIFFICULTY
ADL_SCORE(%): 0
ABILITY_TO_PERFORM_ACTIVITY_WITH_YOUR_NORMAL_TECHNIQUE: NO DIFFICULTY AT ALL
SWINGING_OBJECTS_LIKE_A_GOLF_CLUB: NO DIFFICULTY AT ALL
WALKING_INITIALLY: SLIGHT DIFFICULTY
RUNNING_ONE_MILE: NO DIFFICULTY AT ALL
LIGHT_TO_MODERATE_WORK: SLIGHT DIFFICULTY
WALKING_FOR_APPROXIMATELY_10_MINUTES: NO DIFFICULTY AT ALL
SPORTS_HIGHEST_POTENTIAL_SCORE: 36
STEPPING UP AND DOWN CURBS: NO DIFFICULTY AT ALL
GOING_UP_1_FLIGHT_OF_STAIRS: SLIGHT DIFFICULTY
SPORTS_COUNT: 9
ADL_HIGHEST_POTENTIAL_SCORE: 68
SITTING_FOR_15_MINUTES: SLIGHT DIFFICULTY
STEPPING_UP_AND_DOWN_CURBS: NO DIFFICULTY AT ALL
TWISTING/PIVOTING_ON_INVOLVED_LEG: NO DIFFICULTY AT ALL
RECREATIONAL ACTIVITIES: NO DIFFICULTY AT ALL
GETTING_INTO_AND_OUT_OF_AN_AVERAGE_CAR: SLIGHT DIFFICULTY
PUTTING ON SOCKS AND SHOES: NO DIFFICULTY AT ALL
CUTTING/LATERAL_MOVEMENTS: NO DIFFICULTY AT ALL
PLEASE_INDICATE_YOR_PRIMARY_REASON_FOR_REFERRAL_TO_THERAPY:: HIP
WALKING_UP_STEEP_HILLS: NO DIFFICULTY AT ALL
HOS_ADL_HIGHEST_POTENTIAL_SCORE: 68
HOS_ADL_ITEM_SCORE_TOTAL: 61
TWISTING/PIVOTING ON INVOLVED LEG: NO DIFFICULTY AT ALL
HEAVY_WORK: SLIGHT DIFFICULTY
GOING DOWN 1 FLIGHT OF STAIRS: NO DIFFICULTY AT ALL
GETTING_INTO_AND_OUT_OF_A_BATHTUB: NO DIFFICULTY AT ALL
WALKING_DOWN_STEEP_HILLS: NO DIFFICULTY AT ALL
SPORTS_TOTAL_ITEM_SCORE: 0
PUTTING_ON_SOCKS_AND_SHOES: NO DIFFICULTY AT ALL
SITTING FOR 15 MINUTES: SLIGHT DIFFICULTY
WALKING_15_MINUTES_OR_GREATER: NO DIFFICULTY AT ALL
STANDING_FOR_15_MINUTES: NO DIFFICULTY AT ALL
JUMPING: NO DIFFICULTY AT ALL
HOW_WOULD_YOU_RATE_YOUR_CURRENT_LEVEL_OF_FUNCTION?: NORMAL
DEEP_SQUATTING: NO DIFFICULTY AT ALL
STANDING FOR 15 MINUTES: NO DIFFICULTY AT ALL
STARTING_AND_STOPPING_QUICKLY: SLIGHT DIFFICULTY
ADL_COUNT: 17
GETTING INTO AND OUT OF AN AVERAGE CAR: SLIGHT DIFFICULTY

## 2025-02-26 ENCOUNTER — THERAPY VISIT (OUTPATIENT)
Dept: PHYSICAL THERAPY | Facility: REHABILITATION | Age: 55
End: 2025-02-26
Attending: STUDENT IN AN ORGANIZED HEALTH CARE EDUCATION/TRAINING PROGRAM
Payer: COMMERCIAL

## 2025-02-26 DIAGNOSIS — G89.29 CHRONIC LEFT-SIDED LOW BACK PAIN WITHOUT SCIATICA: ICD-10-CM

## 2025-02-26 DIAGNOSIS — M54.50 CHRONIC LEFT-SIDED LOW BACK PAIN WITHOUT SCIATICA: ICD-10-CM

## 2025-02-26 DIAGNOSIS — M62.81 MUSCLE WEAKNESS (GENERALIZED): ICD-10-CM

## 2025-02-26 DIAGNOSIS — M53.3 SACROILIAC JOINT DYSFUNCTION: Primary | ICD-10-CM

## 2025-02-26 PROCEDURE — 97161 PT EVAL LOW COMPLEX 20 MIN: CPT | Mod: GP | Performed by: PHYSICAL THERAPIST

## 2025-02-26 PROCEDURE — 97110 THERAPEUTIC EXERCISES: CPT | Mod: GP | Performed by: PHYSICAL THERAPIST

## 2025-02-26 NOTE — PROGRESS NOTES
PHYSICAL THERAPY EVALUATION  Type of Visit: Evaluation     Fall Risk Screen:  Fall screen completed by: PT  Have you fallen 2 or more times in the past year?: No  Have you fallen and had an injury in the past year?: No  Is patient a fall risk?: No    Subjective         Presenting condition or subjective complaint: Pt reports that she is having pain in her L low back and hip area for almost the past year .  The pain just started one day.  She feels the most pain with proloned standing, sitting for too long and rolling over in bed.  She can do her ADL's with no pain.  Her pain can be reduced by changing position and rest.  She also finds sx reduction with NSAIDS and a heating pad.  She goes to the gym 5x/week with 30 min on treadmill and does about 20 minutes of lifting with 10#.  She does not experience pain in the gym.  She also uses a [employer] massage table for about 10 minutes after workout.  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  Hobbies/Interests:      Patient goals for therapy:      Pain assessment: Pain present     Objective   LUMBAR SPINE EVALUATION  PAIN: Pain Quality: Aching and Dull  INTEGUMENTARY (edema, incisions): WNL  POSTURE:  Fair to good   GAIT: WNL     NON-WEIGHTBEARING ALIGNMENT:  Pelvis level       ROM:   Hip ROM( ) AROM in degrees    Left Right   Hip Flexion (0-120 ) WNL WNL   Hip Abduction (0-45 ) WNL WNL   Hip External Rotation (0-50 ) WNL WNL   Hip Internal Rotation (0-40 ) WNL WNL   Hip Extension (0-15 )     Knee flexion (120-150 )     Knee Extension (0 )      PROM in degrees    Left Right   Lumbar ROM Left Right   Lumbar Side Bending Min loss, no pain  Min loss, R LB/SI pain    Lumbar SB+Ext  No pain  Pain inc   Lumbar Rotation WNL, no pain    Lumbar Flexion Min loss, no pain    Lumbar Extension Min loss, no pain      STRENGTH (MYOTOMES):   */5 Left Right   Hip Flexion (L2) 4+ 5   Hip Extension (L3) 4- 5   Hip Abduction 4+ 5   Hip Adduction  4+ 5   Hip Internal Rotation 4 5   Hip External Rotation 4 5   Knee Flexion 4 5   Knee Extension 4 5   Dorsiflexion (L4)     Great Toe Extension (L5)     Plantarflexion (S1)       DTR'S: WNL    DERMATOMES: WNL    FLEXIBILITY:  L>R hamstring tightness    LUMBAR/HIP Special Tests:   Lumbar Special Tests Left Right   Quadrant test - -   Straight leg raise tighter -   Crossover response - -   Slump tighter -   Prone instability test     Augusto's     Sit-up test    Trunk extensor endurance test    Repeated flexion    Repeated extension    Other:    SI Tests Left Right   SI Compression + -   SI Distraction + -   POSH (Thigh Thrust) Min + -   Sacral Thrust + -   FADIR - -   MARY - -   Gaenslen's Test     Resisted Abduction     Pubic shotgun - -   Other:       PALPATION: Increased tenderness over the L SI joint; increased L gluteal tightness and tenderness     SPINAL SEGMENTAL CONCLUSIONS: WNL        Assessment & Plan   CLINICAL IMPRESSIONS  Medical Diagnosis: Chronic left-sided low back pain without sciatica    Treatment Diagnosis: Chronic L LBP/SI pain and weakness   Impression/Assessment:  Pt presents to PT with L sacroiliac (SI) joint pain that started several months ago with insidious onset. The pt notes the most pain with prolonged standing for cooking, prolonged  sitting and rolling over in bed.  She has decreased L hip, core and gluteal strength with decreased awareness.  Lumbar ROM is mostly WNL with minimal pain in R SB.  The pt has tenderness over the L SI joint, but otherwise no other tenderness notes.  She will benefit from PT to address these impairments and improve her function and pain.     Clinical Decision Making (Complexity):  Clinical Presentation: Stable/Uncomplicated  Clinical Presentation Rationale: based on medical and personal factors listed in PT evaluation  Clinical Decision Making (Complexity): Low complexity    PLAN OF CARE  Treatment Interventions:  Modalities: Cryotherapy, E-stim, Hot Pack  Interventions: Manual Therapy, Neuromuscular Re-education, Therapeutic Activity, Therapeutic Exercise, Self-Care/Home Management    Long Term Goals     PT Goal 1  Goal Identifier: Carver  Goal Description: Pt will demonstrate readiness for independent sx management and HEP  Rationale: to maximize safety and independence within the home  Target Date: 05/26/25  PT Goal 2  Goal Identifier: Standing  Goal Description: Pt will be able to stand for up to 2 hours for meal prep and clean-up with increased ease and LBP </=4/10  Rationale: to maximize safety and independence with performance of ADLs and functional tasks  Target Date: 05/26/25  PT Goal 3  Goal Identifier: Rolling in bed  Goal Description: Pt will be able to roll over in bed with increased ease and LBP </=4/10 for improved quality of sleep  Target Date: 05/26/25  PT Goal 4  Goal Identifier: Sitting  Goal Description: Pt will be able to sit for >/=1 hour for meals, convesation, etc with pain </=4/10  Rationale: to maximize safety and independence with performance of ADLs and functional tasks  Target Date: 05/26/25      Frequency of Treatment: 1x/week  Duration of Treatment: 90 days    Recommended Referrals to Other Professionals:  None   Education Assessment:   Learner/Method:  Patient;Demonstration;Pictures/Video  Education Comments: Pt educated on POC, pathology, etc    Risks and benefits of evaluation/treatment have been explained.   Patient/Family/caregiver agrees with Plan of Care.     Evaluation Time:     PT Eval, Low Complexity Minutes (01258): 25    Signing Clinician: Nancy Gorman PT

## 2025-03-18 ENCOUNTER — OFFICE VISIT (OUTPATIENT)
Dept: FAMILY MEDICINE | Facility: CLINIC | Age: 55
End: 2025-03-18
Payer: COMMERCIAL

## 2025-03-18 VITALS
HEART RATE: 75 BPM | SYSTOLIC BLOOD PRESSURE: 126 MMHG | OXYGEN SATURATION: 98 % | HEIGHT: 64 IN | DIASTOLIC BLOOD PRESSURE: 80 MMHG | WEIGHT: 174 LBS | RESPIRATION RATE: 17 BRPM | BODY MASS INDEX: 29.71 KG/M2 | TEMPERATURE: 97.9 F

## 2025-03-18 DIAGNOSIS — Z13.220 LIPID SCREENING: ICD-10-CM

## 2025-03-18 DIAGNOSIS — Z23 IMMUNIZATION DUE: ICD-10-CM

## 2025-03-18 DIAGNOSIS — Z12.11 COLON CANCER SCREENING: ICD-10-CM

## 2025-03-18 DIAGNOSIS — Z00.00 ENCOUNTER FOR ROUTINE HISTORY AND PHYSICAL EXAMINATION OF ADULT: Primary | ICD-10-CM

## 2025-03-18 DIAGNOSIS — N92.1 MENORRHAGIA WITH IRREGULAR CYCLE: ICD-10-CM

## 2025-03-18 DIAGNOSIS — E53.8 VITAMIN B12 DEFICIENCY (NON ANEMIC): ICD-10-CM

## 2025-03-18 DIAGNOSIS — Z13.1 SCREENING FOR DIABETES MELLITUS: ICD-10-CM

## 2025-03-18 LAB
EST. AVERAGE GLUCOSE BLD GHB EST-MCNC: 114 MG/DL
HBA1C MFR BLD: 5.6 % (ref 0–5.6)

## 2025-03-18 PROCEDURE — 83036 HEMOGLOBIN GLYCOSYLATED A1C: CPT | Performed by: FAMILY MEDICINE

## 2025-03-18 PROCEDURE — 83002 ASSAY OF GONADOTROPIN (LH): CPT | Performed by: FAMILY MEDICINE

## 2025-03-18 PROCEDURE — 84443 ASSAY THYROID STIM HORMONE: CPT | Performed by: FAMILY MEDICINE

## 2025-03-18 PROCEDURE — 3079F DIAST BP 80-89 MM HG: CPT | Performed by: FAMILY MEDICINE

## 2025-03-18 PROCEDURE — 80061 LIPID PANEL: CPT | Performed by: FAMILY MEDICINE

## 2025-03-18 PROCEDURE — 83001 ASSAY OF GONADOTROPIN (FSH): CPT | Performed by: FAMILY MEDICINE

## 2025-03-18 PROCEDURE — 99396 PREV VISIT EST AGE 40-64: CPT | Performed by: FAMILY MEDICINE

## 2025-03-18 PROCEDURE — 82607 VITAMIN B-12: CPT | Performed by: FAMILY MEDICINE

## 2025-03-18 PROCEDURE — 36415 COLL VENOUS BLD VENIPUNCTURE: CPT | Performed by: FAMILY MEDICINE

## 2025-03-18 PROCEDURE — 3074F SYST BP LT 130 MM HG: CPT | Performed by: FAMILY MEDICINE

## 2025-03-18 SDOH — HEALTH STABILITY: PHYSICAL HEALTH: ON AVERAGE, HOW MANY MINUTES DO YOU ENGAGE IN EXERCISE AT THIS LEVEL?: 60 MIN

## 2025-03-18 SDOH — HEALTH STABILITY: PHYSICAL HEALTH: ON AVERAGE, HOW MANY DAYS PER WEEK DO YOU ENGAGE IN MODERATE TO STRENUOUS EXERCISE (LIKE A BRISK WALK)?: 5 DAYS

## 2025-03-18 ASSESSMENT — ANXIETY QUESTIONNAIRES
GAD7 TOTAL SCORE: 0
7. FEELING AFRAID AS IF SOMETHING AWFUL MIGHT HAPPEN: NOT AT ALL
5. BEING SO RESTLESS THAT IT IS HARD TO SIT STILL: NOT AT ALL
IF YOU CHECKED OFF ANY PROBLEMS ON THIS QUESTIONNAIRE, HOW DIFFICULT HAVE THESE PROBLEMS MADE IT FOR YOU TO DO YOUR WORK, TAKE CARE OF THINGS AT HOME, OR GET ALONG WITH OTHER PEOPLE: NOT DIFFICULT AT ALL
7. FEELING AFRAID AS IF SOMETHING AWFUL MIGHT HAPPEN: NOT AT ALL
2. NOT BEING ABLE TO STOP OR CONTROL WORRYING: NOT AT ALL
GAD7 TOTAL SCORE: 0
8. IF YOU CHECKED OFF ANY PROBLEMS, HOW DIFFICULT HAVE THESE MADE IT FOR YOU TO DO YOUR WORK, TAKE CARE OF THINGS AT HOME, OR GET ALONG WITH OTHER PEOPLE?: NOT DIFFICULT AT ALL
3. WORRYING TOO MUCH ABOUT DIFFERENT THINGS: NOT AT ALL
GAD7 TOTAL SCORE: 0
1. FEELING NERVOUS, ANXIOUS, OR ON EDGE: NOT AT ALL
6. BECOMING EASILY ANNOYED OR IRRITABLE: NOT AT ALL
4. TROUBLE RELAXING: NOT AT ALL

## 2025-03-18 ASSESSMENT — PATIENT HEALTH QUESTIONNAIRE - PHQ9
10. IF YOU CHECKED OFF ANY PROBLEMS, HOW DIFFICULT HAVE THESE PROBLEMS MADE IT FOR YOU TO DO YOUR WORK, TAKE CARE OF THINGS AT HOME, OR GET ALONG WITH OTHER PEOPLE: NOT DIFFICULT AT ALL
SUM OF ALL RESPONSES TO PHQ QUESTIONS 1-9: 0
SUM OF ALL RESPONSES TO PHQ QUESTIONS 1-9: 0

## 2025-03-18 ASSESSMENT — SOCIAL DETERMINANTS OF HEALTH (SDOH): HOW OFTEN DO YOU GET TOGETHER WITH FRIENDS OR RELATIVES?: ONCE A WEEK

## 2025-03-18 NOTE — PROGRESS NOTES
"Preventive Care Visit  [facility]  Nancy Mata MD, Family Medicine  Mar 18, 2025      Assessment & Plan     Encounter for routine history and physical examination of adult  Routine health maintenance discussion:  No smoking, limited alcohol (7 or less servings per week), 5 fruits/veg servings per day, 200 minutes of exercise per week.  Daily calcium/vitamin D guidelines, bone health, colon cancer screening beginning at age 50.  Accident avoidance, sun screen.     Menorrhagia with irregular cycle  -Will check LH and FSH to make sure this is not post menopausal bleeding, and ultrasound pending. If abnormal or if LH/FSH are elevated will need referral to OB-Gyn for discussion of post menopausal bleeding  - Luteinizing Hormone  - Follicle stimulating hormone  - TSH with free T4 reflex    Vitamin B12 deficiency (non anemic)  - Vitamin B12    Lipid screening  - Lipid panel reflex to direct LDL Non-fasting    Screening for diabetes mellitus  - Hemoglobin A1c    Immunization due  - Pneumococcal 20 Valent Conjugate (PCV20)    Colon cancer screening  - Colonoscopy Screening  Referral      BMI  Estimated body mass index is 29.87 kg/m  as calculated from the following:    Height as of this encounter: 1.626 m (5' 4\").    Weight as of this encounter: 78.9 kg (174 lb).   Weight management plan: Discussed healthy diet and exercise guidelines    Counseling  Appropriate preventive services were addressed with this patient via screening, questionnaire, or discussion as appropriate for fall prevention, nutrition, physical activity, Tobacco-use cessation, social engagement, weight loss and cognition.  Checklist reviewing preventive services available has been given to the patient.  Reviewed patient's diet, addressing concerns and/or questions.         Gino Jackson is a 55 year old, presenting for the following:  Physical        3/18/2025     1:48 PM   Additional Questions   Roomed by " Klarissa CMA          HPI    She is still having her period off and on, will have it for two months in a row and then it will go away for two months. She will have a uterine ultrasound in a few days. She does have some hot flashes as well.   PT is doing well for her back, does seem to be helping.       Advance Care Planning  Patient does not have a Health Care Directive: Discussed advance care planning with patient; information given to patient to review.      3/18/2025   General Health   How would you rate your overall physical health? Good   Feel stress (tense, anxious, or unable to sleep) Not at all         3/18/2025   Nutrition   Three or more servings of calcium each day? Yes   Diet: Regular (no restrictions)   How many servings of fruit and vegetables per day? (!) 2-3   How many sweetened beverages each day? 0-1         3/18/2025   Exercise   Days per week of moderate/strenous exercise 5 days   Average minutes spent exercising at this level 60 min         3/18/2025   Social Factors   Frequency of gathering with friends or relatives Once a week   Worry food won't last until get money to buy more No   Food not last or not have enough money for food? No   Do you have housing? (Housing is defined as stable permanent housing and does not include staying ouside in a car, in a tent, in an abandoned building, in an overnight shelter, or couch-surfing.) Yes   Are you worried about losing your housing? No   Lack of transportation? No   Unable to get utilities (heat,electricity)? No         3/18/2025   Fall Risk   Fallen 2 or more times in the past year? No   Trouble with walking or balance? No          3/18/2025   Dental   Dentist two times every year? Yes         Today's PHQ-9 Score:       3/18/2025     1:39 PM   PHQ-9 SCORE   PHQ-9 Total Score MyChart 0   PHQ-9 Total Score 0        Patient-reported         3/18/2025   Substance Use   Alcohol more than 3/day or more than 7/wk Not Applicable   Do you use any other  substances recreationally? No     Social History     Tobacco Use    Smoking status: Never     Passive exposure: Never    Smokeless tobacco: Never   Vaping Use    Vaping status: Never Used   Substance Use Topics    Alcohol use: No    Drug use: No           12/11/2024   LAST FHS-7 RESULTS   1st degree relative breast or ovarian cancer No   Any relative bilateral breast cancer No   Any male have breast cancer No   Any ONE woman have BOTH breast AND ovarian cancer No   Any woman with breast cancer before 50yrs No   2 or more relatives with breast AND/OR ovarian cancer Yes   2 or more relatives with breast AND/OR bowel cancer Yes                3/18/2025   STI Screening   New sexual partner(s) since last STI/HIV test? No     History of abnormal Pap smear: No - age 30-64 HPV with reflex Pap every 5 years recommended        Latest Ref Rng & Units 3/4/2022    11:12 AM 10/22/2018     3:44 PM   PAP / HPV   PAP  Negative for Intraepithelial Lesion or Malignancy (NILM)  Negative for squamous intraepithelial lesion or malignancy  Electronically signed by Cheryl Juarez CT (ASCP) on 10/26/2018 at  3:33 PM      HPV 16 DNA Negative Negative  Negative    HPV 18 DNA Negative Negative  Negative    Other HR HPV Negative Negative  Negative      ASCVD Risk   The 10-year ASCVD risk score (Danita ALFREDO, et al., 2019) is: 1.5%    Values used to calculate the score:      Age: 55 years      Sex: Female      Is Non- : No      Diabetic: No      Tobacco smoker: No      Systolic Blood Pressure: 126 mmHg      Is BP treated: No      HDL Cholesterol: 61 mg/dL      Total Cholesterol: 190 mg/dL         Reviewed and updated as needed this visit by Provider   Tobacco  Allergies  Meds  Problems  Med Hx  Surg Hx  Fam Hx                Review of Systems  Constitutional, HEENT, cardiovascular, pulmonary, gi and gu systems are negative, except as otherwise noted.     Objective    Exam  /80   Pulse 75   Temp  "97.9  F (36.6  C)   Resp 17   Ht 1.626 m (5' 4\")   Wt 78.9 kg (174 lb)   LMP 03/10/2025   SpO2 98%   BMI 29.87 kg/m     Estimated body mass index is 29.87 kg/m  as calculated from the following:    Height as of this encounter: 1.626 m (5' 4\").    Weight as of this encounter: 78.9 kg (174 lb).    Physical Exam  GENERAL: alert and no distress  EYES: Eyes grossly normal to inspection, PERRL and conjunctivae and sclerae normal  HENT: ear canals and TM's normal, nose and mouth without ulcers or lesions  NECK: no adenopathy, no asymmetry, masses, or scars  RESP: lungs clear to auscultation - no rales, rhonchi or wheezes  CV: regular rate and rhythm, normal S1 S2, no S3 or S4, no murmur, click or rub, no peripheral edema  ABDOMEN: soft, nontender, no hepatosplenomegaly, no masses and bowel sounds normal  MS: no gross musculoskeletal defects noted, no edema  SKIN: no suspicious lesions or rashes  NEURO: Normal strength and tone, mentation intact and speech normal  PSYCH: mentation appears normal, affect normal/bright        Signed Electronically by: Nancy Mata MD    Answers submitted by the patient for this visit:  Patient Health Questionnaire (Submitted on 3/18/2025)  If you checked off any problems, how difficult have these problems made it for you to do your work, take care of things at home, or get along with other people?: Not difficult at all  PHQ9 TOTAL SCORE: 0  Patient Health Questionnaire (G7) (Submitted on 3/18/2025)  ALYCE 7 TOTAL SCORE: 0    "

## 2025-03-19 LAB
CHOLEST SERPL-MCNC: 200 MG/DL
FASTING STATUS PATIENT QL REPORTED: NO
FSH SERPL IRP2-ACNC: 47.6 MIU/ML
HDLC SERPL-MCNC: 63 MG/DL
LDLC SERPL CALC-MCNC: 116 MG/DL
LH SERPL-ACNC: 22.3 MIU/ML
NONHDLC SERPL-MCNC: 137 MG/DL
TRIGL SERPL-MCNC: 105 MG/DL
TSH SERPL DL<=0.005 MIU/L-ACNC: 2.84 UIU/ML (ref 0.3–4.2)
VIT B12 SERPL-MCNC: 262 PG/ML (ref 232–1245)

## 2025-03-20 ENCOUNTER — HOSPITAL ENCOUNTER (OUTPATIENT)
Dept: ULTRASOUND IMAGING | Facility: CLINIC | Age: 55
Discharge: HOME | End: 2025-03-20
Attending: FAMILY MEDICINE
Payer: COMMERCIAL

## 2025-03-20 DIAGNOSIS — D25.2 SUBSEROUS LEIOMYOMA OF UTERUS: ICD-10-CM

## 2025-03-20 PROCEDURE — 76856 US EXAM PELVIC COMPLETE: CPT

## 2025-03-24 ENCOUNTER — THERAPY VISIT (OUTPATIENT)
Dept: PHYSICAL THERAPY | Facility: REHABILITATION | Age: 55
End: 2025-03-24
Payer: COMMERCIAL

## 2025-03-24 DIAGNOSIS — M53.3 SACROILIAC JOINT DYSFUNCTION: Primary | ICD-10-CM

## 2025-03-24 DIAGNOSIS — M62.81 MUSCLE WEAKNESS (GENERALIZED): ICD-10-CM

## 2025-03-24 PROCEDURE — 97110 THERAPEUTIC EXERCISES: CPT | Mod: GP | Performed by: PHYSICAL THERAPIST

## 2025-03-25 ENCOUNTER — ALLIED HEALTH/NURSE VISIT (OUTPATIENT)
Dept: FAMILY MEDICINE | Facility: CLINIC | Age: 55
End: 2025-03-25
Payer: COMMERCIAL

## 2025-03-25 VITALS — TEMPERATURE: 98.3 F

## 2025-03-25 DIAGNOSIS — Z23 IMMUNIZATION DUE: ICD-10-CM

## 2025-03-25 DIAGNOSIS — Z23 ENCOUNTER FOR IMMUNIZATION: Primary | ICD-10-CM

## 2025-03-25 PROCEDURE — 90677 PCV20 VACCINE IM: CPT

## 2025-03-25 PROCEDURE — 90471 IMMUNIZATION ADMIN: CPT

## 2025-03-25 PROCEDURE — 99207 PR NO CHARGE NURSE ONLY: CPT

## 2025-03-25 NOTE — PROGRESS NOTES
Prior to immunization administration, verified patients identity using patient s name and date of birth. Please see Immunization Activity for additional information.     Screening Questionnaire for Adult Immunization    Are you sick today?   No   Do you have allergies to medications, food, a vaccine component or latex?   No   Have you ever had a serious reaction after receiving a vaccination?   No   Do you have a long-term health problem with heart, lung, kidney, or metabolic disease (e.g., diabetes), asthma, a blood disorder, no spleen, complement component deficiency, a cochlear implant, or a spinal fluid leak?  Are you on long-term aspirin therapy?   No   Do you have cancer, leukemia, HIV/AIDS, or any other immune system problem?   No   Do you have a parent, brother, or sister with an immune system problem?   No   In the past 3 months, have you taken medications that affect  your immune system, such as prednisone, other steroids, or anticancer drugs; drugs for the treatment of rheumatoid arthritis, Crohn s disease, or psoriasis; or have you had radiation treatments?   No   Have you had a seizure, or a brain or other nervous system problem?   No   During the past year, have you received a transfusion of blood or blood    products, or been given immune (gamma) globulin or antiviral drug?   No   For women: Are you pregnant or is there a chance you could become       pregnant during the next month?   No   Have you received any vaccinations in the past 4 weeks?   No     Immunization questionnaire answers were all negative.    I have reviewed the following standing orders: This patient is due and qualifies for the Pneumococcal vaccine.    Click here for Pneumococcal (Adult) Standing Order    I have reviewed the vaccines inclusion and exclusion criteria;No concerns regarding eligibility.     Patient instructed to remain in clinic for 15 minutes afterwards, and to report any adverse reactions.     Screening performed by  Saroj Laguerre MA on 3/25/2025 at 3:06 PM.   Prior to immunization administration, verified patients identity using patient s name and date of birth. Please see Immunization Activity for additional information.     Is the patient's temperature normal (100.5 or less)? Yes     Patient MEETS CRITERIA. PROCEED with vaccine administration.        3/25/2025   General Questionnaire    Do you have any questions for your care team about the vaccines you will be receiving today? no             3/25/2025   Pneumococcal   Have you had a serious reaction to a pneumonia, diphtheria, or MMR vaccine or to something in these vaccines? No         Patient MEETS CRITERIA. PROCEED with vaccine administration.      Patient instructed to remain in clinic for 15 minutes afterwards, and to report any adverse reactions.      Link to Ancillary Visit Immunization Standing Orders SmartSet     Screening performed by Saroj Laguerre MA on 3/25/2025 at 3:08 PM.

## 2025-04-01 ENCOUNTER — LAB REQUISITION (OUTPATIENT)
Dept: LAB | Facility: CLINIC | Age: 55
End: 2025-04-01

## 2025-04-01 ENCOUNTER — TRANSFERRED RECORDS (OUTPATIENT)
Dept: HEALTH INFORMATION MANAGEMENT | Facility: CLINIC | Age: 55
End: 2025-04-01
Payer: COMMERCIAL

## 2025-04-01 DIAGNOSIS — Z12.4 ENCOUNTER FOR SCREENING FOR MALIGNANT NEOPLASM OF CERVIX: ICD-10-CM

## 2025-04-01 DIAGNOSIS — N92.6 IRREGULAR MENSTRUATION, UNSPECIFIED: ICD-10-CM

## 2025-04-01 PROCEDURE — 87624 HPV HI-RISK TYP POOLED RSLT: CPT | Performed by: OBSTETRICS & GYNECOLOGY

## 2025-04-03 ENCOUNTER — TRANSFERRED RECORDS (OUTPATIENT)
Dept: HEALTH INFORMATION MANAGEMENT | Facility: CLINIC | Age: 55
End: 2025-04-03
Payer: COMMERCIAL

## 2025-04-03 LAB
HPV HR 12 DNA CVX QL NAA+PROBE: NEGATIVE
HPV16 DNA CVX QL NAA+PROBE: NEGATIVE
HPV18 DNA CVX QL NAA+PROBE: NEGATIVE
HUMAN PAPILLOMA VIRUS FINAL DIAGNOSIS: NORMAL

## 2025-04-04 ENCOUNTER — TRANSFERRED RECORDS (OUTPATIENT)
Dept: HEALTH INFORMATION MANAGEMENT | Facility: CLINIC | Age: 55
End: 2025-04-04
Payer: COMMERCIAL

## 2025-04-08 ENCOUNTER — THERAPY VISIT (OUTPATIENT)
Dept: PHYSICAL THERAPY | Facility: REHABILITATION | Age: 55
End: 2025-04-08
Payer: COMMERCIAL

## 2025-04-08 DIAGNOSIS — M53.3 SACROILIAC JOINT DYSFUNCTION: Primary | ICD-10-CM

## 2025-04-08 DIAGNOSIS — M62.81 MUSCLE WEAKNESS (GENERALIZED): ICD-10-CM

## 2025-04-08 PROCEDURE — 97110 THERAPEUTIC EXERCISES: CPT | Mod: GP | Performed by: PHYSICAL THERAPIST

## 2025-04-15 ENCOUNTER — THERAPY VISIT (OUTPATIENT)
Dept: PHYSICAL THERAPY | Facility: REHABILITATION | Age: 55
End: 2025-04-15
Payer: COMMERCIAL

## 2025-04-15 DIAGNOSIS — M62.81 MUSCLE WEAKNESS (GENERALIZED): ICD-10-CM

## 2025-04-15 DIAGNOSIS — M53.3 SACROILIAC JOINT DYSFUNCTION: Primary | ICD-10-CM

## 2025-04-15 PROCEDURE — 97110 THERAPEUTIC EXERCISES: CPT | Mod: GP | Performed by: PHYSICAL THERAPIST

## 2025-08-02 ENCOUNTER — MYC MEDICAL ADVICE (OUTPATIENT)
Dept: FAMILY MEDICINE | Facility: CLINIC | Age: 55
End: 2025-08-02
Payer: COMMERCIAL

## 2025-08-02 DIAGNOSIS — M54.42 CHRONIC BILATERAL LOW BACK PAIN WITH LEFT-SIDED SCIATICA: Primary | ICD-10-CM

## 2025-08-02 DIAGNOSIS — G89.29 CHRONIC BILATERAL LOW BACK PAIN WITH LEFT-SIDED SCIATICA: Primary | ICD-10-CM

## 2025-09-02 ENCOUNTER — MYC MEDICAL ADVICE (OUTPATIENT)
Dept: FAMILY MEDICINE | Facility: CLINIC | Age: 55
End: 2025-09-02
Payer: COMMERCIAL